# Patient Record
Sex: FEMALE | Race: WHITE | NOT HISPANIC OR LATINO
[De-identification: names, ages, dates, MRNs, and addresses within clinical notes are randomized per-mention and may not be internally consistent; named-entity substitution may affect disease eponyms.]

---

## 2017-04-02 ENCOUNTER — RESULT REVIEW (OUTPATIENT)
Age: 22
End: 2017-04-02

## 2018-03-31 ENCOUNTER — INPATIENT (INPATIENT)
Facility: HOSPITAL | Age: 23
LOS: 1 days | Discharge: HOME | DRG: 370 | End: 2018-04-02
Attending: STUDENT IN AN ORGANIZED HEALTH CARE EDUCATION/TRAINING PROGRAM | Admitting: INTERNAL MEDICINE
Payer: COMMERCIAL

## 2018-03-31 VITALS
RESPIRATION RATE: 17 BRPM | DIASTOLIC BLOOD PRESSURE: 78 MMHG | OXYGEN SATURATION: 100 % | SYSTOLIC BLOOD PRESSURE: 114 MMHG | TEMPERATURE: 98 F | HEART RATE: 58 BPM

## 2018-03-31 DIAGNOSIS — Z29.9 ENCOUNTER FOR PROPHYLACTIC MEASURES, UNSPECIFIED: ICD-10-CM

## 2018-03-31 DIAGNOSIS — Q87.89 OTHER SPECIFIED CONGENITAL MALFORMATION SYNDROMES, NOT ELSEWHERE CLASSIFIED: ICD-10-CM

## 2018-03-31 DIAGNOSIS — I85.01 ESOPHAGEAL VARICES WITH BLEEDING: ICD-10-CM

## 2018-03-31 DIAGNOSIS — D69.6 THROMBOCYTOPENIA, UNSPECIFIED: ICD-10-CM

## 2018-03-31 DIAGNOSIS — F32.9 MAJOR DEPRESSIVE DISORDER, SINGLE EPISODE, UNSPECIFIED: ICD-10-CM

## 2018-03-31 DIAGNOSIS — K92.0 HEMATEMESIS: ICD-10-CM

## 2018-03-31 DIAGNOSIS — F41.9 ANXIETY DISORDER, UNSPECIFIED: ICD-10-CM

## 2018-03-31 LAB
ALBUMIN SERPL ELPH-MCNC: 4.2 G/DL — SIGNIFICANT CHANGE UP (ref 3.3–5)
ALBUMIN SERPL ELPH-MCNC: 4.3 G/DL — SIGNIFICANT CHANGE UP (ref 3.3–5)
ALP SERPL-CCNC: 76 U/L — SIGNIFICANT CHANGE UP (ref 40–120)
ALP SERPL-CCNC: 78 U/L — SIGNIFICANT CHANGE UP (ref 40–120)
ALT FLD-CCNC: 19 U/L RC — SIGNIFICANT CHANGE UP (ref 10–45)
ALT FLD-CCNC: 24 U/L RC — SIGNIFICANT CHANGE UP (ref 10–45)
ANION GAP SERPL CALC-SCNC: 13 MMOL/L — SIGNIFICANT CHANGE UP (ref 5–17)
ANION GAP SERPL CALC-SCNC: 13 MMOL/L — SIGNIFICANT CHANGE UP (ref 5–17)
APTT BLD: 26.9 SEC — LOW (ref 27.5–37.4)
APTT BLD: 30 SEC — SIGNIFICANT CHANGE UP (ref 27.5–37.4)
AST SERPL-CCNC: 34 U/L — SIGNIFICANT CHANGE UP (ref 10–40)
AST SERPL-CCNC: 52 U/L — HIGH (ref 10–40)
BASOPHILS # BLD AUTO: 0 K/UL — SIGNIFICANT CHANGE UP (ref 0–0.2)
BASOPHILS # BLD AUTO: 0.1 K/UL — SIGNIFICANT CHANGE UP (ref 0–0.2)
BASOPHILS # BLD AUTO: 0.1 K/UL — SIGNIFICANT CHANGE UP (ref 0–0.2)
BASOPHILS NFR BLD AUTO: 0.4 % — SIGNIFICANT CHANGE UP (ref 0–2)
BASOPHILS NFR BLD AUTO: 0.5 % — SIGNIFICANT CHANGE UP (ref 0–2)
BASOPHILS NFR BLD AUTO: 0.8 % — SIGNIFICANT CHANGE UP (ref 0–2)
BILIRUB SERPL-MCNC: 0.5 MG/DL — SIGNIFICANT CHANGE UP (ref 0.2–1.2)
BILIRUB SERPL-MCNC: 0.8 MG/DL — SIGNIFICANT CHANGE UP (ref 0.2–1.2)
BLD GP AB SCN SERPL QL: NEGATIVE — SIGNIFICANT CHANGE UP
BUN SERPL-MCNC: 12 MG/DL — SIGNIFICANT CHANGE UP (ref 7–23)
BUN SERPL-MCNC: 14 MG/DL — SIGNIFICANT CHANGE UP (ref 7–23)
CALCIUM SERPL-MCNC: 8.5 MG/DL — SIGNIFICANT CHANGE UP (ref 8.4–10.5)
CALCIUM SERPL-MCNC: 8.8 MG/DL — SIGNIFICANT CHANGE UP (ref 8.4–10.5)
CHLORIDE SERPL-SCNC: 101 MMOL/L — SIGNIFICANT CHANGE UP (ref 96–108)
CHLORIDE SERPL-SCNC: 101 MMOL/L — SIGNIFICANT CHANGE UP (ref 96–108)
CO2 SERPL-SCNC: 21 MMOL/L — LOW (ref 22–31)
CO2 SERPL-SCNC: 24 MMOL/L — SIGNIFICANT CHANGE UP (ref 22–31)
CREAT SERPL-MCNC: 0.63 MG/DL — SIGNIFICANT CHANGE UP (ref 0.5–1.3)
CREAT SERPL-MCNC: 0.67 MG/DL — SIGNIFICANT CHANGE UP (ref 0.5–1.3)
EOSINOPHIL # BLD AUTO: 0 K/UL — SIGNIFICANT CHANGE UP (ref 0–0.5)
EOSINOPHIL # BLD AUTO: 0.1 K/UL — SIGNIFICANT CHANGE UP (ref 0–0.5)
EOSINOPHIL # BLD AUTO: 0.2 K/UL — SIGNIFICANT CHANGE UP (ref 0–0.5)
EOSINOPHIL NFR BLD AUTO: 0.4 % — SIGNIFICANT CHANGE UP (ref 0–6)
EOSINOPHIL NFR BLD AUTO: 0.7 % — SIGNIFICANT CHANGE UP (ref 0–6)
EOSINOPHIL NFR BLD AUTO: 1.7 % — SIGNIFICANT CHANGE UP (ref 0–6)
GAS PNL BLDV: SIGNIFICANT CHANGE UP
GLUCOSE SERPL-MCNC: 116 MG/DL — HIGH (ref 70–99)
GLUCOSE SERPL-MCNC: 162 MG/DL — HIGH (ref 70–99)
HCG SERPL-ACNC: <2 MIU/ML — LOW (ref 5–24)
HCT VFR BLD CALC: 32.4 % — LOW (ref 34.5–45)
HCT VFR BLD CALC: 35.5 % — SIGNIFICANT CHANGE UP (ref 34.5–45)
HCT VFR BLD CALC: 35.7 % — SIGNIFICANT CHANGE UP (ref 34.5–45)
HGB BLD-MCNC: 11.2 G/DL — LOW (ref 11.5–15.5)
HGB BLD-MCNC: 12 G/DL — SIGNIFICANT CHANGE UP (ref 11.5–15.5)
HGB BLD-MCNC: 12.2 G/DL — SIGNIFICANT CHANGE UP (ref 11.5–15.5)
INR BLD: 1.25 RATIO — HIGH (ref 0.88–1.16)
INR BLD: 1.27 RATIO — HIGH (ref 0.88–1.16)
LYMPHOCYTES # BLD AUTO: 1.2 K/UL — SIGNIFICANT CHANGE UP (ref 1–3.3)
LYMPHOCYTES # BLD AUTO: 20.1 % — SIGNIFICANT CHANGE UP (ref 13–44)
LYMPHOCYTES # BLD AUTO: 26.2 % — SIGNIFICANT CHANGE UP (ref 13–44)
LYMPHOCYTES # BLD AUTO: 3.1 K/UL — SIGNIFICANT CHANGE UP (ref 1–3.3)
LYMPHOCYTES # BLD AUTO: 3.3 K/UL — SIGNIFICANT CHANGE UP (ref 1–3.3)
LYMPHOCYTES # BLD AUTO: 33.3 % — SIGNIFICANT CHANGE UP (ref 13–44)
MAGNESIUM SERPL-MCNC: 1.9 MG/DL — SIGNIFICANT CHANGE UP (ref 1.6–2.6)
MCHC RBC-ENTMCNC: 30.1 PG — SIGNIFICANT CHANGE UP (ref 27–34)
MCHC RBC-ENTMCNC: 30.6 PG — SIGNIFICANT CHANGE UP (ref 27–34)
MCHC RBC-ENTMCNC: 30.9 PG — SIGNIFICANT CHANGE UP (ref 27–34)
MCHC RBC-ENTMCNC: 33.8 GM/DL — SIGNIFICANT CHANGE UP (ref 32–36)
MCHC RBC-ENTMCNC: 34 GM/DL — SIGNIFICANT CHANGE UP (ref 32–36)
MCHC RBC-ENTMCNC: 34.7 GM/DL — SIGNIFICANT CHANGE UP (ref 32–36)
MCV RBC AUTO: 88.9 FL — SIGNIFICANT CHANGE UP (ref 80–100)
MCV RBC AUTO: 89.1 FL — SIGNIFICANT CHANGE UP (ref 80–100)
MCV RBC AUTO: 89.9 FL — SIGNIFICANT CHANGE UP (ref 80–100)
MONOCYTES # BLD AUTO: 0.5 K/UL — SIGNIFICANT CHANGE UP (ref 0–0.9)
MONOCYTES # BLD AUTO: 0.7 K/UL — SIGNIFICANT CHANGE UP (ref 0–0.9)
MONOCYTES # BLD AUTO: 0.9 K/UL — SIGNIFICANT CHANGE UP (ref 0–0.9)
MONOCYTES NFR BLD AUTO: 5.3 % — SIGNIFICANT CHANGE UP (ref 2–14)
MONOCYTES NFR BLD AUTO: 8 % — SIGNIFICANT CHANGE UP (ref 2–14)
MONOCYTES NFR BLD AUTO: 9.6 % — SIGNIFICANT CHANGE UP (ref 2–14)
NEUTROPHILS # BLD AUTO: 4.2 K/UL — SIGNIFICANT CHANGE UP (ref 1.8–7.4)
NEUTROPHILS # BLD AUTO: 5.1 K/UL — SIGNIFICANT CHANGE UP (ref 1.8–7.4)
NEUTROPHILS # BLD AUTO: 8.5 K/UL — HIGH (ref 1.8–7.4)
NEUTROPHILS NFR BLD AUTO: 54.6 % — SIGNIFICANT CHANGE UP (ref 43–77)
NEUTROPHILS NFR BLD AUTO: 67.5 % — SIGNIFICANT CHANGE UP (ref 43–77)
NEUTROPHILS NFR BLD AUTO: 70.8 % — SIGNIFICANT CHANGE UP (ref 43–77)
PHOSPHATE SERPL-MCNC: 2.2 MG/DL — LOW (ref 2.5–4.5)
PLAT MORPH BLD: NORMAL — SIGNIFICANT CHANGE UP
PLATELET # BLD AUTO: 110 K/UL — LOW (ref 150–400)
PLATELET # BLD AUTO: 155 K/UL — SIGNIFICANT CHANGE UP (ref 150–400)
PLATELET # BLD AUTO: 79 K/UL — LOW (ref 150–400)
POTASSIUM SERPL-MCNC: 4 MMOL/L — SIGNIFICANT CHANGE UP (ref 3.5–5.3)
POTASSIUM SERPL-MCNC: 5.8 MMOL/L — HIGH (ref 3.5–5.3)
POTASSIUM SERPL-SCNC: 4 MMOL/L — SIGNIFICANT CHANGE UP (ref 3.5–5.3)
POTASSIUM SERPL-SCNC: 5.8 MMOL/L — HIGH (ref 3.5–5.3)
PROT SERPL-MCNC: 7.5 G/DL — SIGNIFICANT CHANGE UP (ref 6–8.3)
PROT SERPL-MCNC: 7.8 G/DL — SIGNIFICANT CHANGE UP (ref 6–8.3)
PROTHROM AB SERPL-ACNC: 13.7 SEC — HIGH (ref 9.8–12.7)
PROTHROM AB SERPL-ACNC: 13.8 SEC — HIGH (ref 9.8–12.7)
RBC # BLD: 3.63 M/UL — LOW (ref 3.8–5.2)
RBC # BLD: 3.97 M/UL — SIGNIFICANT CHANGE UP (ref 3.8–5.2)
RBC # BLD: 3.99 M/UL — SIGNIFICANT CHANGE UP (ref 3.8–5.2)
RBC # FLD: 13.7 % — SIGNIFICANT CHANGE UP (ref 10.3–14.5)
RBC # FLD: 14 % — SIGNIFICANT CHANGE UP (ref 10.3–14.5)
RBC # FLD: 14 % — SIGNIFICANT CHANGE UP (ref 10.3–14.5)
RBC BLD AUTO: SIGNIFICANT CHANGE UP
RH IG SCN BLD-IMP: POSITIVE — SIGNIFICANT CHANGE UP
SODIUM SERPL-SCNC: 135 MMOL/L — SIGNIFICANT CHANGE UP (ref 135–145)
SODIUM SERPL-SCNC: 138 MMOL/L — SIGNIFICANT CHANGE UP (ref 135–145)
WBC # BLD: 12.6 K/UL — HIGH (ref 3.8–10.5)
WBC # BLD: 6 K/UL — SIGNIFICANT CHANGE UP (ref 3.8–10.5)
WBC # BLD: 9.3 K/UL — SIGNIFICANT CHANGE UP (ref 3.8–10.5)
WBC # FLD AUTO: 12.6 K/UL — HIGH (ref 3.8–10.5)
WBC # FLD AUTO: 6 K/UL — SIGNIFICANT CHANGE UP (ref 3.8–10.5)
WBC # FLD AUTO: 9.3 K/UL — SIGNIFICANT CHANGE UP (ref 3.8–10.5)

## 2018-03-31 PROCEDURE — 71045 X-RAY EXAM CHEST 1 VIEW: CPT | Mod: 26,77

## 2018-03-31 PROCEDURE — 31500 INSERT EMERGENCY AIRWAY: CPT

## 2018-03-31 PROCEDURE — 93010 ELECTROCARDIOGRAM REPORT: CPT

## 2018-03-31 PROCEDURE — 99291 CRITICAL CARE FIRST HOUR: CPT

## 2018-03-31 PROCEDURE — 99292 CRITICAL CARE ADDL 30 MIN: CPT

## 2018-03-31 PROCEDURE — 71045 X-RAY EXAM CHEST 1 VIEW: CPT | Mod: 26

## 2018-03-31 PROCEDURE — 99291 CRITICAL CARE FIRST HOUR: CPT | Mod: 25

## 2018-03-31 RX ORDER — ALPRAZOLAM 0.25 MG
1 TABLET ORAL
Qty: 0 | Refills: 0 | Status: DISCONTINUED | OUTPATIENT
Start: 2018-03-31 | End: 2018-04-02

## 2018-03-31 RX ORDER — NOREPINEPHRINE BITARTRATE/D5W 8 MG/250ML
0.01 PLASTIC BAG, INJECTION (ML) INTRAVENOUS
Qty: 8 | Refills: 0 | Status: DISCONTINUED | OUTPATIENT
Start: 2018-03-31 | End: 2018-03-31

## 2018-03-31 RX ORDER — OCTREOTIDE ACETATE 200 UG/ML
50 INJECTION, SOLUTION INTRAVENOUS; SUBCUTANEOUS
Qty: 500 | Refills: 0 | Status: DISCONTINUED | OUTPATIENT
Start: 2018-03-31 | End: 2018-03-31

## 2018-03-31 RX ORDER — PROPOFOL 10 MG/ML
330 INJECTION, EMULSION INTRAVENOUS ONCE
Qty: 0 | Refills: 0 | Status: COMPLETED | OUTPATIENT
Start: 2018-03-31 | End: 2018-03-31

## 2018-03-31 RX ORDER — MIDAZOLAM HYDROCHLORIDE 1 MG/ML
4 INJECTION, SOLUTION INTRAMUSCULAR; INTRAVENOUS ONCE
Qty: 0 | Refills: 0 | Status: DISCONTINUED | OUTPATIENT
Start: 2018-03-31 | End: 2018-03-31

## 2018-03-31 RX ORDER — FENTANYL CITRATE 50 UG/ML
100 INJECTION INTRAVENOUS ONCE
Qty: 0 | Refills: 0 | Status: DISCONTINUED | OUTPATIENT
Start: 2018-03-31 | End: 2018-03-31

## 2018-03-31 RX ORDER — ALPRAZOLAM 0.25 MG
0.5 TABLET ORAL
Qty: 0 | Refills: 0 | Status: DISCONTINUED | OUTPATIENT
Start: 2018-03-31 | End: 2018-04-02

## 2018-03-31 RX ORDER — ONDANSETRON 8 MG/1
4 TABLET, FILM COATED ORAL THREE TIMES A DAY
Qty: 0 | Refills: 0 | Status: DISCONTINUED | OUTPATIENT
Start: 2018-03-31 | End: 2018-04-02

## 2018-03-31 RX ORDER — PROPOFOL 10 MG/ML
330 INJECTION, EMULSION INTRAVENOUS ONCE
Qty: 0 | Refills: 0 | Status: DISCONTINUED | OUTPATIENT
Start: 2018-03-31 | End: 2018-03-31

## 2018-03-31 RX ORDER — ONDANSETRON 8 MG/1
4 TABLET, FILM COATED ORAL ONCE
Qty: 0 | Refills: 0 | Status: COMPLETED | OUTPATIENT
Start: 2018-03-31 | End: 2018-03-31

## 2018-03-31 RX ORDER — PANTOPRAZOLE SODIUM 20 MG/1
40 TABLET, DELAYED RELEASE ORAL ONCE
Qty: 0 | Refills: 0 | Status: COMPLETED | OUTPATIENT
Start: 2018-03-31 | End: 2018-03-31

## 2018-03-31 RX ORDER — PANTOPRAZOLE SODIUM 20 MG/1
8 TABLET, DELAYED RELEASE ORAL
Qty: 80 | Refills: 0 | Status: DISCONTINUED | OUTPATIENT
Start: 2018-03-31 | End: 2018-03-31

## 2018-03-31 RX ORDER — ALPRAZOLAM 0.25 MG
1 TABLET ORAL
Qty: 0 | Refills: 0 | COMMUNITY

## 2018-03-31 RX ORDER — OCTREOTIDE ACETATE 200 UG/ML
50 INJECTION, SOLUTION INTRAVENOUS; SUBCUTANEOUS ONCE
Qty: 0 | Refills: 0 | Status: COMPLETED | OUTPATIENT
Start: 2018-03-31 | End: 2018-03-31

## 2018-03-31 RX ORDER — CEFTRIAXONE 500 MG/1
1 INJECTION, POWDER, FOR SOLUTION INTRAMUSCULAR; INTRAVENOUS ONCE
Qty: 0 | Refills: 0 | Status: COMPLETED | OUTPATIENT
Start: 2018-03-31 | End: 2018-03-31

## 2018-03-31 RX ORDER — PROPOFOL 10 MG/ML
5 INJECTION, EMULSION INTRAVENOUS
Qty: 500 | Refills: 0 | Status: DISCONTINUED | OUTPATIENT
Start: 2018-03-31 | End: 2018-03-31

## 2018-03-31 RX ORDER — PANTOPRAZOLE SODIUM 20 MG/1
40 TABLET, DELAYED RELEASE ORAL
Qty: 0 | Refills: 0 | Status: DISCONTINUED | OUTPATIENT
Start: 2018-03-31 | End: 2018-04-02

## 2018-03-31 RX ORDER — ESCITALOPRAM OXALATE 10 MG/1
40 TABLET, FILM COATED ORAL DAILY
Qty: 0 | Refills: 0 | Status: DISCONTINUED | OUTPATIENT
Start: 2018-03-31 | End: 2018-04-02

## 2018-03-31 RX ORDER — SODIUM CHLORIDE 9 MG/ML
1000 INJECTION INTRAMUSCULAR; INTRAVENOUS; SUBCUTANEOUS ONCE
Qty: 0 | Refills: 0 | Status: COMPLETED | OUTPATIENT
Start: 2018-03-31 | End: 2018-03-31

## 2018-03-31 RX ADMIN — PANTOPRAZOLE SODIUM 40 MILLIGRAM(S): 20 TABLET, DELAYED RELEASE ORAL at 18:20

## 2018-03-31 RX ADMIN — OCTREOTIDE ACETATE 10 MICROGRAM(S)/HR: 200 INJECTION, SOLUTION INTRAVENOUS; SUBCUTANEOUS at 07:53

## 2018-03-31 RX ADMIN — FENTANYL CITRATE 100 MICROGRAM(S): 50 INJECTION INTRAVENOUS at 11:30

## 2018-03-31 RX ADMIN — FENTANYL CITRATE 100 MICROGRAM(S): 50 INJECTION INTRAVENOUS at 12:00

## 2018-03-31 RX ADMIN — Medication 0.5 MILLIGRAM(S): at 18:20

## 2018-03-31 RX ADMIN — SODIUM CHLORIDE 1000 MILLILITER(S): 9 INJECTION INTRAMUSCULAR; INTRAVENOUS; SUBCUTANEOUS at 17:10

## 2018-03-31 RX ADMIN — FENTANYL CITRATE 100 MICROGRAM(S): 50 INJECTION INTRAVENOUS at 11:45

## 2018-03-31 RX ADMIN — MIDAZOLAM HYDROCHLORIDE 4 MILLIGRAM(S): 1 INJECTION, SOLUTION INTRAMUSCULAR; INTRAVENOUS at 11:45

## 2018-03-31 RX ADMIN — CEFTRIAXONE 100 GRAM(S): 500 INJECTION, POWDER, FOR SOLUTION INTRAMUSCULAR; INTRAVENOUS at 06:55

## 2018-03-31 RX ADMIN — FENTANYL CITRATE 100 MICROGRAM(S): 50 INJECTION INTRAVENOUS at 12:45

## 2018-03-31 RX ADMIN — FENTANYL CITRATE 100 MICROGRAM(S): 50 INJECTION INTRAVENOUS at 12:30

## 2018-03-31 RX ADMIN — ONDANSETRON 4 MILLIGRAM(S): 8 TABLET, FILM COATED ORAL at 09:00

## 2018-03-31 RX ADMIN — PROPOFOL 330 MILLIGRAM(S): 10 INJECTION, EMULSION INTRAVENOUS at 12:30

## 2018-03-31 RX ADMIN — MIDAZOLAM HYDROCHLORIDE 4 MILLIGRAM(S): 1 INJECTION, SOLUTION INTRAMUSCULAR; INTRAVENOUS at 11:20

## 2018-03-31 RX ADMIN — PANTOPRAZOLE SODIUM 10 MG/HR: 20 TABLET, DELAYED RELEASE ORAL at 09:00

## 2018-03-31 RX ADMIN — OCTREOTIDE ACETATE 50 MICROGRAM(S): 200 INJECTION, SOLUTION INTRAVENOUS; SUBCUTANEOUS at 08:00

## 2018-03-31 RX ADMIN — MIDAZOLAM HYDROCHLORIDE 4 MILLIGRAM(S): 1 INJECTION, SOLUTION INTRAMUSCULAR; INTRAVENOUS at 11:30

## 2018-03-31 RX ADMIN — Medication 0.5 MILLIGRAM(S): at 23:05

## 2018-03-31 RX ADMIN — PANTOPRAZOLE SODIUM 40 MILLIGRAM(S): 20 TABLET, DELAYED RELEASE ORAL at 06:54

## 2018-03-31 RX ADMIN — FENTANYL CITRATE 100 MICROGRAM(S): 50 INJECTION INTRAVENOUS at 12:15

## 2018-03-31 RX ADMIN — FENTANYL CITRATE 100 MICROGRAM(S): 50 INJECTION INTRAVENOUS at 13:00

## 2018-03-31 RX ADMIN — MIDAZOLAM HYDROCHLORIDE 4 MILLIGRAM(S): 1 INJECTION, SOLUTION INTRAMUSCULAR; INTRAVENOUS at 12:50

## 2018-03-31 RX ADMIN — ONDANSETRON 4 MILLIGRAM(S): 8 TABLET, FILM COATED ORAL at 06:55

## 2018-03-31 NOTE — CHART NOTE - NSCHARTNOTEFT_GEN_A_CORE
UPPER ENDOSCOPY Performed with Dr Proctor and Dr. Myrick     IMPRESSION  - Dionne-Laura tear. This is likely the source of bleeding.   - Hematin (altered blood/coffee-ground-like material) in the gastric fundus.   - Non-bleeding erosive gastropathy.   - Normal duodenal bulb and 2nd part of the duodenum.   - No specimens collected.     RECOMMENDATION  - Observe patient in MICU for ongoing care.   - Use a proton pump inhibitor IV BID.   - Use Zofran (ondansetron) 4 mg IV TID.   - Recommend antitussives.   - Monitor CBC, transfuse as needed to goal Hb>7  - Discussed with family including mother, father and significant partner.   - Dr Proctor's team will continue care for this patient. UPPER ENDOSCOPY Performed with Dr Proctor and Dr. Myrick     IMPRESSION  - Prior scarring in esophagus from prior variceal banding  - Dionne-Laura tear. This is likely the source of bleeding.   - Hematin (altered blood/coffee-ground-like material) in the gastric fundus.   - Mild portal hypertensive gastropathy and gastric erosions.  - Possible non-bleeding gastric varices.   - Normal duodenal bulb and 2nd part of the duodenum.     RECOMMENDATION  - Observe patient in MICU for ongoing care.   - Use a proton pump inhibitor IV BID.   - Use Zofran (ondansetron) 4 mg IV TID. Check EKG for QTc prolongation  - Recommend antitussives.   - Recommend abdominal sonogram complete.  - Monitor CBC, transfuse as needed to goal Hb>7  - Extubation per MICU team  - Diet as tolerated   - Due to computer issues with the travel cart, pictures could not be captured.   - Discussed with family including mother, father and significant partner.   - Dr Proctor's team will continue care for this patient.

## 2018-03-31 NOTE — PROCEDURE NOTE - NSTRACHPOSTINTU_RESP_A_CORE
Chest X-Ray/Positive end tidal Co2 noted/Breath sounds equal/Chest excursion noted/Breath sounds bilateral

## 2018-03-31 NOTE — ED ADULT NURSE NOTE - PMH
ADHD (attention deficit hyperactivity disorder)    Anxiety    ASD (atrial septal defect)    Branchio-elizabeth-renal syndrome    Depression    Duplicate cervix    Esophageal varices  s/p banding 1996  Hypersplenism    Iron deficiency anemia    Polycystic ovarian syndrome  diagnosed 7/2011  Portal hypertension    Portal vein thrombosis  1996  Thrombocytopenia    Vaginal septum  1994

## 2018-03-31 NOTE — ED PROVIDER NOTE - CARE PLAN
Principal Discharge DX:	Bleeding esophageal varices, unspecified esophageal varices type  Secondary Diagnosis:	Hematemesis with nausea

## 2018-03-31 NOTE — ED PROVIDER NOTE - OBJECTIVE STATEMENT
Resident: 23y F PMH branchio-elizabeth-renal syndrome (diagnosed 1994), portal hypertension, esophageal varices s/p banding (last 1996), portal vein thrombosis, ASD repair, anxiety/depression requiring inpatient hospitalization in past presenting with hematemesis x 1 episode this AM. Patient states she woke up from sleep feeling nauseous, had 1 episode large volume maroon vomitus. Since vomiting, patient complains of mild epigastric discomfort. Resident: 23y F PMH branchio-elizabeth-renal syndrome (diagnosed 1994), portal hypertension, esophageal varices s/p banding (last 1996), portal vein thrombosis, ASD repair, anxiety/depression requiring inpatient hospitalization in past presenting with hematemesis x 1 episode this AM. Patient states she woke up from sleep feeling nauseous, had 1 episode large volume maroon vomitus. Since vomiting, patient complains of mild epigastric discomfort.    LEXI Titus

## 2018-03-31 NOTE — CONSULT NOTE ADULT - ASSESSMENT
23 WF congenital liver disease with a history of portal vein thrombosis, esophageal varices (at age 1 s/p banding), portal hypertension, ASD s/p repair, anxiety/depression presenting with hematemesis.  Differential of bleeding: PHG, MW tear, PUD, GAVE, recurrent varices (doubt).    P: Admit to ICU  NPO  Monitor VS, BMs, labs  Transfuse prn  IV ppi  IV octreotide  EGD today. RBA explained.    Thanks you.

## 2018-03-31 NOTE — H&P ADULT - NSHPLABSRESULTS_GEN_ALL_CORE
Vital Signs Last 24 Hrs  T(C): 36.6 (31 Mar 2018 06:06), Max: 36.6 (31 Mar 2018 06:06)  T(F): 97.8 (31 Mar 2018 06:06), Max: 97.8 (31 Mar 2018 06:06)  HR: 85 (31 Mar 2018 11:30) (58 - 88)  BP: 114/66 (31 Mar 2018 11:30) (96/67 - 132/70)  BP(mean): 84 (31 Mar 2018 11:30) (84 - 84)  RR: 21 (31 Mar 2018 11:30) (16 - 21)  SpO2: 100% (31 Mar 2018 11:30) (100% - 100%)  CAPILLARY BLOOD GLUCOSE      LABS:                        12.2   12.6  )-----------( 155      ( 31 Mar 2018 12:35 )             35.7     Auto Eosinophil # 0.1   / Auto Eosinophil % 0.4   / Auto Neutrophil # 8.5   / Auto Neutrophil % 67.5  / BANDS % x                            11.2   6.0   )-----------( 79       ( 31 Mar 2018 09:16 )             32.4     Auto Eosinophil # 0.0   / Auto Eosinophil % 0.7   / Auto Neutrophil # 4.2   / Auto Neutrophil % 70.8  / BANDS % x                            12.0   9.3   )-----------( 110      ( 31 Mar 2018 06:58 )             35.5     Auto Eosinophil # 0.2   / Auto Eosinophil % 1.7   / Auto Neutrophil # 5.1   / Auto Neutrophil % 54.6  / BANDS % x        03-31    135  |  101  |  12  ----------------------------<  162<H>  5.8<H>   |  21<L>  |  0.63  03-31    138  |  101  |  14  ----------------------------<  116<H>  4.0   |  24  |  0.67    Ca    8.5      31 Mar 2018 12:35  Mg     1.9     03-31  Phos  2.2     03-31  TPro  7.8  /  Alb  4.3  /  TBili  0.8  /  DBili  x   /  AST  52<H>  /  ALT  24  /  AlkPhos  76  03-31  TPro  7.5  /  Alb  4.2  /  TBili  0.5  /  DBili  x   /  AST  34  /  ALT  19  /  AlkPhos  78  03-31    PT/INR - ( 31 Mar 2018 12:35 )   PT: 13.8 sec;   INR: 1.27 ratio         PTT - ( 31 Mar 2018 12:35 )  PTT:30.0 sec          RADIOLOGY & ADDITIONAL TESTS:    Imaging Personally Reviewed:    Consultant(s) Notes Reviewed:  gastro    Care Discussed with Consultants/Other Providers: gastro

## 2018-03-31 NOTE — CHART NOTE - NSCHARTNOTEFT_GEN_A_CORE
Patient Name:	Vanessa Antunez	YOB: 1995  Address:	[address] [place] [address]	Sex:	Female  Rx Written	Rx Dispensed	Drug	Quantity	Days Supply	Prescriber Name  03/16/2018	03/20/2018	alprazolam 1 mg tablet	60	30	HectorAlec MD  03/16/2018	03/17/2018	alprazolam er 1 mg tablet	60	30	HectorAlec MD  03/02/2018	03/05/2018	dexmethylphenidate er 15 mg cp	30	30	HectorAlec MD  02/16/2018	02/16/2018	alprazolam 1 mg tablet	60	30	HectorAlec MD  02/16/2018	02/16/2018	alprazolam er 1 mg tablet	60	30	HectorAlec MD  02/05/2018	02/05/2018	alprazolam 1 mg tablet	28	14	Pietro PHAM, Kyaw  01/23/2018	01/29/2018	dexmethylphenidate er 15 mg cp	30	30	HectorAlec MD  01/05/2018	01/05/2018	alprazolam 1 mg tablet	60	30	HectorAlec MD  01/05/2018	01/05/2018	alprazolam er 1 mg tablet	60	30	HectorAlec MD  12/01/2017	12/07/2017	alprazolam er 1 mg tablet	60	30	HectorAlec MD  12/01/2017	12/06/2017	alprazolam 1 mg tablet	60	30	HectorAlec MD  11/10/2017	11/10/2017	alprazolam er 1 mg tablet	60	30	HectorAlec MD  11/09/2017	11/09/2017	alprazolam 1 mg tablet	60	30	HectorAlec MD  10/12/2017	10/12/2017	alprazolam er 1 mg tablet	60	30	HectorAlec MD  10/11/2017	10/11/2017	alprazolam 1 mg tablet	60	30	HectorAlec MD  09/22/2017	09/27/2017	dexmethylphenidate er 15 mg cp	30	30	HectorAlec MD  09/06/2017	09/06/2017	alprazolam 1 mg tablet	60	30	HectorAlec MD  09/01/2017	09/01/2017	alprazolam er 1 mg tablet	60	30	HectorAlec MD  07/18/2017	07/18/2017	alprazolam 1 mg tablet	60	30	[place]  07/18/2017	07/18/2017	dexmethylphenidate er 15 mg cp	30	30	[place], Alec ANDRADE MD  07/18/2017	07/18/2017	alprazolam er 1 mg tablet	60	30	[place], Alec ANDRADE MD  06/14/2017	06/14/2017	alprazolam er 1 mg tablet	60	30	[place] MD  06/09/2017	06/09/2017	dexmethylphenidate er 15 mg cp	30	30	[place] MD  05/12/2017	05/26/2017	alprazolam 1 mg tablet	60	30	[place], Alec ANDRADE MD  05/24/2017	05/24/2017	dexmethylphenidate er 20 mg cp	30	30	[place], Alec ANDRADE MD  04/24/2017	05/15/2017	alprazolam er 1 mg tablet	60	30	[place] MD  05/12/2017	05/12/2017	tramadol hcl 50 mg tablet	30	10	[facility] Susi  04/07/2017	04/27/2017	alprazolam 1 mg tablet	60	30	[place] MD  04/11/2017	04/11/2017	alprazolam er 1 mg tablet	60	30	[place] MD  04/07/2017	04/07/2017	dexmethylphenidate er 20 mg cp	30	30	[place] MD

## 2018-03-31 NOTE — AIRWAY REMOVAL NOTE  ADULT & PEDS - ARTIFICAL AIRWAY REMOVAL COMMENTS
Patient extubated without complications. Written order for extubation was verified. The patient was identified by full name and birthdate compared to the ID band. Present during the procedure was DIRK Blackman.

## 2018-03-31 NOTE — CHART NOTE - NSCHARTNOTEFT_GEN_A_CORE
Please see MICU resident note for detailed summary.     Pt is a 23F w/ hx of branchio/elizabeth/renal syndrome (dx [id number]) c/b portal HTN w/ esophageal varices (s/p banding), hx of Dionne Laura tear (2013), chronic thrombocytopenia (baseline 60-100s) 2/2 hypersplenism, anxiety and chronic cough (unclear etiology) who p/w 2 episodes of hematemesis earlier this morning. On admission, pt’s hgb was 12.0 (and subsequently 11.2) and she had another episode of hematemesis so pt was admitted to the MICU for urgent EGD. Patient was intubated (and sedated) and an EGD showed a Dionne Laura tear, which was likely the source of bleeding. Pt was extubated post-procedure without any complications.       Follow up:   - monitor CBC and transfuse to goal of 7  - Zofran PRN for nausea   - continue antitussives for cough   - Protonix IV BID   - f/u abdominal US   - Continue Lexapro and alprazolam for anxiety     Brenna Guevara, MAR   [id number]

## 2018-03-31 NOTE — ED ADULT NURSE NOTE - PSH
ASD (atrial septal defect)  s/p repair  Cataract  both eyes - cataract removal with lens implantation  Hernia  bilateral hernia repair  Megaureter  left (1994)  Varices, esophageal  s/p banding

## 2018-03-31 NOTE — H&P ADULT - HISTORY OF PRESENT ILLNESS
23yof hx of branchio/elizabeth/renal syndrome (dc 1994) c/b portal htn, esophageal varices s/p banding, hx of Mallary Laura tear in 2013 presents with hemetemesis x 2 episodes. Pt states she felt nauseous around 5 am this morning, and then had a large volume maroon colored emesis, no clots, no coffee grounds. Mom at bedside states pt presented similarly in 2013 and at that time was found to have a Dionne laura tear on endoscopy. 23yof hx of branchio/elizabeth/renal syndrome (dc 1994) c/b portal htn, esophageal varices s/p banding, hx of Mallary Laura tear in 2013 presents with hemetemesis x 2 episodes. Pt states she felt nauseous around 5 am this morning, and then had a large volume maroon colored emesis, no clots, no coffee grounds. Mom at bedside states pt presented similarly in 2013 and at that time was found to have a Dionne laura tear on endoscopy.  Pt was diagnosed at the age of 1, and has multiple esophageal variceal banding between ages 1-9. Pt fol 23yof hx of branchio/elizabeth/renal syndrome (dc 1994) c/b portal htn, esophageal varices s/p banding, hx of Mallary Laura tear in 2013, anxiety (on xanax), chronic thrombocytopenia (baseline 60-100s) 2/2 hypersplenism presents with hemetemesis x 2 episodes. Pt states she felt nauseous around 5 am this morning, and then had a large volume maroon colored emesis, no clots, no coffee grounds. Mom at bedside states pt presented similarly in 2013 and at that time was found to have a Dionne laura tear on endoscopy.  Pt was diagnosed at the age of 1, and has multiple esophageal variceal banding between ages 2-9.     ED: Pt had another episode of hemetemesis (maroon, watery, no clots).  Received ceftriaxone, octreotide, zofran, protonix.   Vitals stable. 23yof hx of branchio/elizabeth/renal syndrome (dc 1994) c/b portal htn, esophageal varices s/p banding, hx of Mallary Laura tear in 2013, anxiety (on xanax), chronic thrombocytopenia (baseline 60-100s) 2/2 hypersplenism presents with hemetemesis x 2 episodes. Pt states she felt nauseous around 5 am this morning, and then had a large volume maroon colored emesis, no clots, no coffee grounds. Mom at bedside states pt presented similarly in 2013 and at that time was found to have a Dionne laura tear on endoscopy.  Per Mom, patient has a chronic cough (outpatient workup has not shown an etiology). Pt was diagnosed at the age of 1, and has multiple esophageal variceal banding between ages 2-9.     ED: Pt had another episode of hemetemesis (maroon, watery, no clots).  Received ceftriaxone, octreotide, zofran, protonix.   Vitals stable. 23yof hx of branchio/elizabeth/renal syndrome (dc 1994) c/b portal htn, esophageal varices s/p banding, hx of Mallary Laura tear in 2013, anxiety (on xanax), chronic thrombocytopenia (baseline 60-100s) 2/2 hypersplenism presents with hemetemesis x 2 episodes. Pt states she felt nauseous around 5 am this morning, and then had a large volume maroon colored emesis, no clots, no coffee grounds. Mom at bedside states pt presented similarly in 2013 and at that time was found to have a Dionne laura tear on endoscopy.  Per Mom, patient has a chronic cough (outpatient workup has not shown an etiology). Mom reports increased forceful coughing. Pt was diagnosed at the age of 1 with branchio-elizabeth-renal syndrome, and has multiple esophageal variceal banding between ages 2-9.     ED: Pt had another episode of hemetemesis (maroon, watery, no clots).  Received ceftriaxone, octreotide, zofran, protonix.   Vitals stable.

## 2018-03-31 NOTE — H&P ADULT - NSHPREVIEWOFSYSTEMS_GEN_ALL_CORE
CONSTITUTIONAL:  No weight loss, fever, chills, weakness or fatigue.  HEENT:  Eyes:  No visual loss, blurred vision, double vision,No hearing loss, sneezing, congestion, runny nose or sore throat.  SKIN:  No rash or itching.  CARDIOVASCULAR:  No chest pain, chest pressure or chest discomfort.   RESPIRATORY:  No shortness of breath, cough or sputum.  GASTROINTESTINAL:  No anorexia, No abdominal pain, no melena or hematochezia. +hemetemesis.   GENITOURINARY:  Denies hematuria, dysuria.   NEUROLOGICAL:  No headache, dizziness, syncope, paralysis, ataxia, numbness or tingling in the extremities. No change in bowel or bladder control.  MUSCULOSKELETAL:  No muscle, back pain, joint pain or stiffness.  HEMATOLOGIC:  No anemia, bleeding or bruising.  PSYCHIATRIC:  +hx of depression and anxiety.   ENDOCRINOLOGIC:  No reports of sweating, cold or heat intolerance. No polyuria or polydipsia.

## 2018-03-31 NOTE — ED PROVIDER NOTE - CRITICAL CARE PROVIDED
direct patient care (not related to procedure)/documentation/additional history taking/consult w/ pt's family directly relating to pts condition/interpretation of diagnostic studies/consultation with other physicians

## 2018-03-31 NOTE — ED PROVIDER NOTE - MEDICAL DECISION MAKING DETAILS
Resident: hematemesis. Hx varices s/p banding. vitals wnl. mild epigastric tenderness to palpation. Concern for variceal bleed vs other source of UGIB. Will obtain labs, type and screen, zofran, PPI, GI consult, likely admit.

## 2018-03-31 NOTE — H&P ADULT - ATTENDING COMMENTS
Pt is a 22 yo F with h/o branchio/elizabeth/renal syndrome, complicated by portal htn, esophageal varices s/p banding, Dionne-Laura tear, chronic thrombocytopenia (baseline 60-100s) 2to hypersplenism and anxiety d/o presents 2 to hematemesis x 2 (large volume maroon colored emesis).  Pt admitted to the ICU and electively intubated for EGD. s/p EGD: M-W tear. Post procedure pt was awake/alert and weaning well on CPAP 5 + PS 5; extubated. Cont PPI and advance po diet as tolerated. F/u as per GI. Follow Hb trend. Probably to be transferred out of ICU later today. Family and pt updated.    CCT: 40 min not including the procedure

## 2018-03-31 NOTE — H&P ADULT - ASSESSMENT
23yof hx of branchio/elizabeth/renal syndrome (dc 1994) c/b portal htn, esophageal varices s/p banding, hx of Mallary Laura tear in 2013, anxiety (on xanax), chronic thrombocytopenia (baseline 60-100s) 2/2 hypersplenism presents with hemetemesis x 2 episodes likely 2/2 esophageal varices vs. Dionne Laura tear vs. PUD 23yof hx of branchio/elizabeth/renal syndrome (dc 1994) c/b portal htn, esophageal varices s/p banding, hx of Mallary Laura tear in 2013, anxiety (on xanax), chronic thrombocytopenia (baseline 60-100s) 2/2 hypersplenism presents with hemetemesis x 2 episodes likely 2/2 esophageal varices vs. Dionne Laura tear vs. PUD    #Neuro    #Resp    #CV    #GI    #ID    #Renal    #Heme    #Endo    #DVT PPx

## 2018-03-31 NOTE — ED PROVIDER NOTE - NS ED ROS FT
Constitutional: no fever, no chills.  Eyes: no visual changes.  ENMT: no sore throat.  CV: no chest pain.  Resp: no cough, no shortness of breath.  GI: +abdominal pain, +nausea, +vomiting, no diarrhea.  : no dysuria, no hematuria.  MSK: no back pain, no neck pain.  Skin: no rashes.  Neuro: no headache, no loss of consciousness, no weakness, no numbness, no tingling.

## 2018-03-31 NOTE — CHART NOTE - NSCHARTNOTEFT_GEN_A_CORE
MICU Transfer Note    Transfer from: MICU    Transfer to:  (  ) Medicine    (  ) Telemetry    (  ) RCU    (  ) Palliative    (  ) Stroke Unit    (  ) _______________    Accepting physican:  Morgan Washburn       MICU COURSE:  23yof hx of branchio/elizabeth/renal syndrome (dc 1994) c/b portal htn, esophageal varices s/p banding, hx of Mallary Laura tear in 2013, anxiety (on xanax), chronic thrombocytopenia (baseline 60-100s) 2/2 hypersplenism presents with hemetemesis x 2 episodes. Pt states she felt nauseous around 5 am this morning, and then had a large volume maroon colored emesis, no clots, no coffee grounds. Mom at bedside states pt presented similarly in 2013 and at that time was found to have a Dionne laura tear on endoscopy.  Per Mom, patient has a chronic cough (outpatient workup has not shown an etiology). Mom reports increased forceful coughing. Pt was diagnosed at the age of 1 with branchio-elizabeth-renal syndrome, and has multiple esophageal variceal banding between ages 2-9.     ED: Pt had another episode of hematemesis (maroon, watery, no clots).  Received ceftriaxone, octreotide, zofran, protonix.   MICU course. Patient was sedated and intubated for EGD. Patient EGD consistent with Dionne Laura tear. Patient was extubated, restarted diet. Started on hycodan for cough suppression and zofran for nausea / vomiting.     Last  EKG- .       For Follow-Up:  - C/w hycodan for cough, add tessalon perle if needed   - Check EKG daily for QTc for zofran   - C/w pantoprazole   - F/u with GI recs  - Monitor HGB on CBC  - US Abd as per GI MICU Transfer Note    Transfer from: MICU    Transfer to:  (  ) Medicine    (  ) Telemetry    (  ) RCU    (  ) Palliative    (  ) Stroke Unit    (  ) _______________    Accepting physican:  Morgan Washburn       MICU COURSE:  23yof hx of branchio/elizabeth/renal syndrome (dc 1994) c/b portal htn, esophageal varices s/p banding, hx of Mallary Laura tear in 2013, anxiety (on xanax), chronic thrombocytopenia (baseline 60-100s) 2/2 hypersplenism presents with hemetemesis x 2 episodes. Pt states she felt nauseous around 5 am this morning, and then had a large volume maroon colored emesis, no clots, no coffee grounds. Mom at bedside states pt presented similarly in 2013 and at that time was found to have a Dionne laura tear on endoscopy.  Per Mom, patient has a chronic cough (outpatient workup has not shown an etiology). Mom reports increased forceful coughing. Pt was diagnosed at the age of 1 with branchio-elizabeth-renal syndrome, and has multiple esophageal variceal banding between ages 2-9.     ED: Pt had another episode of hematemesis (maroon, watery, no clots).  Received ceftriaxone, octreotide, zofran, protonix.   MICU course. Patient was sedated and intubated for EGD. Patient EGD consistent with Dionne Laura tear. Patient was extubated, restarted diet. Started on hycodan for cough suppression and zofran for nausea / vomiting.     Last  EKG- .       For Follow-Up:  - C/w hycodan for cough, add tessalon perle if needed   - Check EKG daily for QTc for zofran   - C/w pantoprazole   - F/u with GI recs  - Monitor HGB on serial CBC   - F/u with GI for US Abd  - C/w alprazolam MICU Transfer Note    Transfer from: MICU    Transfer to:  (x  ) Medicine    (  ) Telemetry    (  ) RCU    (  ) Palliative    (  ) Stroke Unit    (  ) _______________    Accepting physican:  Morgan Washburn       MICU COURSE:  23yof hx of branchio/elizabeth/renal syndrome (dc 1994) c/b portal htn, esophageal varices s/p banding, hx of Mallary Carroll tear in 2013, anxiety (on xanax), chronic thrombocytopenia (baseline 60-100s) 2/2 hypersplenism presents with hemetemesis x 2 episodes. Pt states she felt nauseous around 5 am this morning, and then had a large volume maroon colored emesis, no clots, no coffee grounds. Mom at bedside states pt presented similarly in 2013 and at that time was found to have a Dionne carroll tear on endoscopy.  Per Mom, patient has a chronic cough (outpatient workup has not shown an etiology). Mom reports increased forceful coughing. Pt was diagnosed at the age of 1 with branchio-elizabeth-renal syndrome, and has multiple esophageal variceal banding between ages 2-9.     ED: Pt had another episode of hematemesis (maroon, watery, no clots).  Received ceftriaxone, octreotide, zofran, protonix.     MICU course. Patient was sedated and intubated for EGD. Patient EGD consistent with Dionne Carroll tear. Patient was extubated, restarted diet. Started on hycodan for cough suppression and zofran for nausea / vomiting.       For Follow-Up:  - C/w hycodan for cough, add tessalon perle if needed   - monitor EKG while on zofran (Last  EKG- )  - C/w pantoprazole   - F/u with GI recs (Dr. Proctor's group)  - Monitor HGB on serial CBC   - F/u with GI for US Abd to r/o liver disease  - C/w alprazolam and lexapro for anxiety    Hawk Lei, PGY3  [id number]

## 2018-03-31 NOTE — H&P ADULT - PROBLEM SELECTOR PLAN 1
Mallary Larua tear vs. PUD vs. Esophageal varicies  -cw protonix gtt for now  -will do endoscopy in MICU with intubation  -f/up GI recs  -serial CBC, monitor BP  -two large bore IV      Addendium: pt found to have a izzy vlad tear. Will extubate patient when off sedation and start clear liquid diet if tolerating. Mallary Laura tear vs. PUD vs. Esophageal varicies  -cw protonix IV BID for now  -will do endoscopy in MICU with intubation  -f/up GI recs  -serial CBC, monitor BP  -two large bore IV  - zofran 4 mg IV TID. Check EKG for QTc prolongation  - Monitor CBC, transfuse as needed to goal Hb>7    Addendium: pt found to have a izzy vlad tear. Will extubate patient when off sedation and start clear liquid diet if tolerating. Dr. Proctor's team to follow . Abdominal sono as per GI

## 2018-03-31 NOTE — H&P ADULT - NSHPPHYSICALEXAM_GEN_ALL_CORE
GENERAL APPEARANCE: Well developed, well nourished, alert and cooperative. NAD.   HEENT:  PERRL, EOMI. hearing grossly intact. Neck supple, non-tender without lymphadenopathy, masses or thyromegaly.  CARDIAC: Normal S1 and S2. No S3, S4 or murmurs. Rhythm is regular.  LUNGS: Clear to auscultation and percussion without rales, rhonchi, wheezing or diminished breath sounds.  ABDOMEN: Positive bowel sounds. Soft, nondistended, nontender. No guarding or rebound.   MUSKULOSKELETAL: ROM intact spine and extremities. No joint erythema or tenderness.   EXTREMITIES: No significant deformity or joint abnormality. No edema. Peripheral pulses intact. No varicosities.  SKIN: Skin normal color, texture and turgor with no lesions or eruptions.  PSYCHIATRIC: AOx3.Normal affect and behavior.

## 2018-03-31 NOTE — ED ADULT NURSE REASSESSMENT NOTE - NS ED NURSE REASSESS COMMENT FT1
MICU consult and GI pending.
0640 am Patient had a bloody emesis about a cup size. Patient received IV medications after PIV inserted.
0935 Pt admitted to MICU. Seen by GI recommending ICU. repeat CBC sent to lab/ results pending.
Pt stable at this time. Bed assigned in MICU.

## 2018-03-31 NOTE — ED ADULT NURSE NOTE - OBJECTIVE STATEMENT
Patient presented to ED ambulatory w/parents c/o mild epigastric pain, one episode of vomiting blood 30 minutes before coming to ED, as per patient blood was dark and patient vomited about the size of small salad bowl. Patient also c/o dizziness.

## 2018-03-31 NOTE — ED PROVIDER NOTE - PROGRESS NOTE DETAILS
Resident: Patient is continuing to have hematemesis here. Currently vital signs are wnl. Spoke to private GI, who prefers that we contact house GI for management of variceal bleed. GI fellow paged. -AV Resident: Spoke to [facility] Dr. Michel, who will speak to Dr. Joy directly regarding who is responsible for consult. Spoke to MICU resident, who will see patient. Patient has been moved to critical room, slightly more hypotensive on repeat vitals. -AV OSVALDO Lewis MD : pt endorsed to me by Dr Salinas. reassessed, bp stable at 115 systolic, no active hematemesis, no nausea at this time. pending GI evaluation. received call from Dr Joy (cell ) who states to have house GI see pt. OSVALDO Lewis MD : pt endorsed to me by Dr Salinas. reassessed, bp stable at 115 systolic, no active hematemesis, no nausea at this time. pending GI evaluation. received call from Dr Cornejo (cell ) who states to have house GI see pt.  [id number]: Dr Cornejo states will be here in 10 min, pt to go to Endo with Dr cornejo as well as house GI. recommends protonix gtt. pt w active hematemesis x2 in Ed, remains HD stable. OSVALDO Lewis MD : Dr Joy at bedside to see pt. pt w worsening vomiting, BP 90's systolic, mapping well.

## 2018-03-31 NOTE — CHART NOTE - NSCHARTNOTEFT_GEN_A_CORE
Asked by Dr. Proctor to potentially assist endoscopically for UGIB (hematemesis)  Pt with pre-sinusoidal portal hypertension. No recent abd sono/CT/MRI.   Last EGD with Alberto 2013: izzy carroll tear, portal hypertensive gastropathy, and gastric ulcer.   HgB 12 and 11.2.   Also discussed case extensively with dad, mom, boyfriend at bedside, and our potential role in case. They have my contact information.   Will await notification from Dr. Proctor if our assistance is needed.

## 2018-03-31 NOTE — ED PROVIDER NOTE - PHYSICAL EXAMINATION
Resident:   Gen: pale, of stated age, mild distress  Head: NC, AT  ENT: PERRL, MMM, no uvular deviation, no tonsilar erythema  Neck: supple with full ROM   Chest: CTAB, no retractions, rate normal, appears to breathe comfortably  Heart: RRR S1S2, No peripheral edema, bilateral pulses in arms and legs  Abd: Soft mild epigastric tenderness to palpation, no rebound or guarding  Back: No spinal deformity, no CVAT  Ext: Moving all 4 extremities without obvious impairment to ROM, no obvious weakness  Neuro: fluid speech

## 2018-04-01 ENCOUNTER — TRANSCRIPTION ENCOUNTER (OUTPATIENT)
Age: 23
End: 2018-04-01

## 2018-04-01 DIAGNOSIS — K76.6 PORTAL HYPERTENSION: ICD-10-CM

## 2018-04-01 LAB
ALBUMIN SERPL ELPH-MCNC: 4 G/DL — SIGNIFICANT CHANGE UP (ref 3.3–5)
ALP SERPL-CCNC: 74 U/L — SIGNIFICANT CHANGE UP (ref 40–120)
ALT FLD-CCNC: 18 U/L RC — SIGNIFICANT CHANGE UP (ref 10–45)
ANION GAP SERPL CALC-SCNC: 12 MMOL/L — SIGNIFICANT CHANGE UP (ref 5–17)
AST SERPL-CCNC: 19 U/L — SIGNIFICANT CHANGE UP (ref 10–40)
BILIRUB SERPL-MCNC: 0.8 MG/DL — SIGNIFICANT CHANGE UP (ref 0.2–1.2)
BUN SERPL-MCNC: 8 MG/DL — SIGNIFICANT CHANGE UP (ref 7–23)
CALCIUM SERPL-MCNC: 8.8 MG/DL — SIGNIFICANT CHANGE UP (ref 8.4–10.5)
CHLORIDE SERPL-SCNC: 102 MMOL/L — SIGNIFICANT CHANGE UP (ref 96–108)
CO2 SERPL-SCNC: 26 MMOL/L — SIGNIFICANT CHANGE UP (ref 22–31)
CREAT SERPL-MCNC: 0.71 MG/DL — SIGNIFICANT CHANGE UP (ref 0.5–1.3)
GLUCOSE SERPL-MCNC: 105 MG/DL — HIGH (ref 70–99)
HCT VFR BLD CALC: 28.3 % — LOW (ref 34.5–45)
HCT VFR BLD CALC: 31.8 % — LOW (ref 34.5–45)
HGB BLD-MCNC: 10.4 G/DL — LOW (ref 11.5–15.5)
HGB BLD-MCNC: 9.7 G/DL — LOW (ref 11.5–15.5)
MAGNESIUM SERPL-MCNC: 1.9 MG/DL — SIGNIFICANT CHANGE UP (ref 1.6–2.6)
MCHC RBC-ENTMCNC: 29.7 PG — SIGNIFICANT CHANGE UP (ref 27–34)
MCHC RBC-ENTMCNC: 30.8 PG — SIGNIFICANT CHANGE UP (ref 27–34)
MCHC RBC-ENTMCNC: 32.8 GM/DL — SIGNIFICANT CHANGE UP (ref 32–36)
MCHC RBC-ENTMCNC: 34.3 GM/DL — SIGNIFICANT CHANGE UP (ref 32–36)
MCV RBC AUTO: 89.7 FL — SIGNIFICANT CHANGE UP (ref 80–100)
MCV RBC AUTO: 90.6 FL — SIGNIFICANT CHANGE UP (ref 80–100)
PHOSPHATE SERPL-MCNC: 1.8 MG/DL — LOW (ref 2.5–4.5)
PLATELET # BLD AUTO: 77 K/UL — LOW (ref 150–400)
PLATELET # BLD AUTO: 92 K/UL — LOW (ref 150–400)
POTASSIUM SERPL-MCNC: 3.5 MMOL/L — SIGNIFICANT CHANGE UP (ref 3.5–5.3)
POTASSIUM SERPL-SCNC: 3.5 MMOL/L — SIGNIFICANT CHANGE UP (ref 3.5–5.3)
PROT SERPL-MCNC: 6.8 G/DL — SIGNIFICANT CHANGE UP (ref 6–8.3)
RBC # BLD: 3.15 M/UL — LOW (ref 3.8–5.2)
RBC # BLD: 3.51 M/UL — LOW (ref 3.8–5.2)
RBC # FLD: 13.9 % — SIGNIFICANT CHANGE UP (ref 10.3–14.5)
RBC # FLD: 14.1 % — SIGNIFICANT CHANGE UP (ref 10.3–14.5)
SODIUM SERPL-SCNC: 140 MMOL/L — SIGNIFICANT CHANGE UP (ref 135–145)
WBC # BLD: 5.1 K/UL — SIGNIFICANT CHANGE UP (ref 3.8–10.5)
WBC # BLD: 6.7 K/UL — SIGNIFICANT CHANGE UP (ref 3.8–10.5)
WBC # FLD AUTO: 5.1 K/UL — SIGNIFICANT CHANGE UP (ref 3.8–10.5)
WBC # FLD AUTO: 6.7 K/UL — SIGNIFICANT CHANGE UP (ref 3.8–10.5)

## 2018-04-01 PROCEDURE — 99233 SBSQ HOSP IP/OBS HIGH 50: CPT | Mod: GC

## 2018-04-01 RX ORDER — SODIUM,POTASSIUM PHOSPHATES 278-250MG
1 POWDER IN PACKET (EA) ORAL
Qty: 0 | Refills: 0 | Status: COMPLETED | OUTPATIENT
Start: 2018-04-01 | End: 2018-04-02

## 2018-04-01 RX ADMIN — PANTOPRAZOLE SODIUM 40 MILLIGRAM(S): 20 TABLET, DELAYED RELEASE ORAL at 06:15

## 2018-04-01 RX ADMIN — Medication 0.5 MILLIGRAM(S): at 11:51

## 2018-04-01 RX ADMIN — Medication 1 TABLET(S): at 13:21

## 2018-04-01 RX ADMIN — Medication 0.5 MILLIGRAM(S): at 17:45

## 2018-04-01 RX ADMIN — ESCITALOPRAM OXALATE 40 MILLIGRAM(S): 10 TABLET, FILM COATED ORAL at 13:21

## 2018-04-01 RX ADMIN — Medication 1 TABLET(S): at 17:45

## 2018-04-01 RX ADMIN — Medication 0.5 MILLIGRAM(S): at 06:15

## 2018-04-01 RX ADMIN — PANTOPRAZOLE SODIUM 40 MILLIGRAM(S): 20 TABLET, DELAYED RELEASE ORAL at 17:47

## 2018-04-01 NOTE — DISCHARGE NOTE ADULT - HOSPITAL COURSE
Ms. 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 Initially seen with CBC of 11.2 -> 12.2. Concern was for varices given history and she was started on ceftriaxone, octreotide, and protonix. She was sent to the MICU, intubated, and an EGD was performed. EGD revealed a Dionne-Laura tear and portal hypertensive gastropathy. Octreotide and ceftriaxone was stopped, and she was extubated. She was down-graded to the medical floors. GI recommended abdominal ultrasound to rule out liver disease. On her second day of admission, she was found to have a Hemoglobin of 10.4. Ms. 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 Initially seen with CBC of 11.2 -> 12.2. Concern was for varices given history and she was started on ceftriaxone, octreotide, and protonix. She was sent to the MICU, intubated, and an EGD was performed. EGD revealed a Dionne-Laura tear and portal hypertensive gastropathy. Octreotide and ceftriaxone was stopped, and she was extubated. She was down-graded to the medical floors. GI recommended abdominal ultrasound to rule out liver disease.     She is currently hemodynamically stable with a hgb that has been stable in the 9's. Pt will follow up as an outpt with her gastroenterologist and have a follow up CBC within a week. Ms. 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 Initially seen with CBC of 11.2 -> 12.2. Concern was for varices given history and she was started on ceftriaxone, octreotide, and protonix. She was sent to the MICU, intubated, and an EGD was performed. EGD revealed a Dionne-Laura tear and portal hypertensive gastropathy. Octreotide and ceftriaxone was stopped, and she was extubated. She was down-graded to the medical floors. GI recommended abdominal ultrasound to rule out liver disease. US showed splenomegaly, atrophic left kidney.      She is currently hemodynamically stable with a hgb that has been stable in the 9's. Pt will follow up as an outpt with her gastroenterologist and have a follow up CBC within a week.

## 2018-04-01 NOTE — DISCHARGE NOTE ADULT - OTHER SIGNIFICANT FINDINGS
UPPER ENDOSCOPY Performed with Dr Proctor and Dr. Myrick     IMPRESSION  - Prior scarring in esophagus from prior variceal banding  - Dionne-Laura tear. This is likely the source of bleeding.   - Hematin (altered blood/coffee-ground-like material) in the gastric fundus.   - Mild portal hypertensive gastropathy and gastric erosions.  - Possible non-bleeding gastric varices.   - Normal duodenal bulb and 2nd part of the duodenum.

## 2018-04-01 NOTE — DISCHARGE NOTE ADULT - CARE PLAN
Principal Discharge DX:	Dionne-Laura tear  Secondary Diagnosis:	Portal hypertension Principal Discharge DX:	Dionne-Laura tear  Goal:	Please follow-up with gastroenterology  Secondary Diagnosis:	Portal hypertension  Goal:	Please follow-up with gastroenterology Principal Discharge DX:	Dionne-Laura tear  Goal:	Please follow-up with gastroenterology  Assessment and plan of treatment:	You were admitted to [facility] for upper GI bleed found to be the result of a tear in the esophagus (Dionne-Laura tear) on endoscopy.  Secondary Diagnosis:	Portal hypertension  Goal:	Please follow-up with gastroenterology Principal Discharge DX:	Dionne-Laura tear  Goal:	Please follow-up with gastroenterology  Assessment and plan of treatment:	You were admitted to [facility] for upper GI bleed found to be the result of a tear in the esophagus (Dionne-Laura tear) on endoscopy. This is to say that you bleed from your throat that had to be fixed with an endoscopy. It is important that you follow up with your gastroenterologist within a week of leaving the hospital.  Secondary Diagnosis:	Portal hypertension  Goal:	Please follow-up with gastroenterology  Assessment and plan of treatment:	You have a history of portal hypertension, which is to say that there is an elevated blood pressure in the blood system connecting your GI tract and your liver. Due to this, you are prone to other diseases, such as Dionne-Laura Tears. Please follow up with gastroenterology within a week of leaving the hospital to follow up on an ultrasound of your liver that was performed while you were hospitalized.

## 2018-04-01 NOTE — PROGRESS NOTE ADULT - PROBLEM SELECTOR PLAN 2
c/b portal htn and esophageal varices  - f/u GI recs 2/2 chronic congenital portal vein thrombosis- last seen on sono 5 years ago  - will discuss with OP GI, pt will need ongoing surveillance and management of varices  - will f/u RUQ sono to r/o possible liver source of portal hypertension

## 2018-04-01 NOTE — DISCHARGE NOTE ADULT - PATIENT PORTAL LINK FT
You can access the [facility] Patient Portal, offered by [facility], by registering with the following website: [email or web address]

## 2018-04-01 NOTE — PROGRESS NOTE ADULT - SUBJECTIVE AND OBJECTIVE BOX
Patient is a 23y old  Female who presents with a chief complaint of Upper GI bleed (31 Mar 2018 11:48)      Overnight Events: No acute events  Subjective: Feels well this AM. No addition stomach upset, nausea, vomiting.  General: No fever or chills.  Head: No lightheadedness, dizziness, ha.  Chest: No SOB, cp, palpitations.  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)  NECK: Supple  CHEST/LUNG: Clear to auscultation bilaterally; No wheeze  HEART: Regular rate and rhythm; No murmurs, rubs, or gallops  ABDOMEN: Soft, Nontender, Nondistended; Bowel sounds present  EXTREMITIES: WWP, no edema  PSYCH: AAOx3  NEUROLOGY: non-focal  SKIN: No rashes or lesions    LABS:  CAPILLARY BLOOD GLUCOSE        I&O's Summary    31 Mar 2018 07:01  -  01 Apr 2018 07:00  --------------------------------------------------------  IN: 1322 mL / OUT: 0 mL / NET: 1322 mL                              12.2   12.6  )-----------( 155      ( 31 Mar 2018 12:35 )             35.7     WBC Trend: 12.6<--, 6.0<--, 9.3<--  03-31    135  |  101  |  12  ----------------------------<  162<H>  5.8<H>   |  21<L>  |  0.63    Ca    8.5      31 Mar 2018 12:35  Phos  2.2     03-31  Mg     1.9     03-31    TPro  7.8  /  Alb  4.3  /  TBili  0.8  /  DBili  x   /  AST  52<H>  /  ALT  24  /  AlkPhos  76  03-31    Creatinine Trend: 0.63<--, 0.67<--  PT/INR - ( 31 Mar 2018 12:35 )   PT: 13.8 sec;   INR: 1.27 ratio         PTT - ( 31 Mar 2018 12:35 )  PTT:30.0 sec              MEDICATIONS  (STANDING):  ALPRAZolam 0.5 milliGRAM(s) Oral four times a day  escitalopram 40 milliGRAM(s) Oral daily  HYDROcodone/homatropine Syrup 5 milliLiter(s) Oral four times a day  pantoprazole  Injectable 40 milliGRAM(s) IV Push two times a day    MEDICATIONS  (PRN):  ALPRAZolam 1 milliGRAM(s) Oral two times a day PRN anxiety not controlled by standing alprazolam  ondansetron Injectable 4 milliGRAM(s) IV Push three times a day PRN Nausea and/or Vomiting Patient is a 23y old  Female who presents with a chief complaint of Upper GI bleed (31 Mar 2018 11:48)      Overnight Events: No acute events  Subjective: Feels well this AM. No addition stomach upset, nausea, vomiting.  General: No fever or chills.  Head: No lightheadedness, dizziness, ha.  Chest: No SOB, cp, palpitations.  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; Duane syndrome)  NECK: Supple  CHEST/LUNG: Clear to auscultation bilaterally; No wheeze  HEART: Regular rate and rhythm; No murmurs, rubs, or gallops  ABDOMEN: Soft, Nontender, Nondistended; Bowel sounds present  EXTREMITIES: WWP, no edema  PSYCH: AAOx3  NEUROLOGY: non-focal  SKIN: No rashes or lesions    LABS:  CAPILLARY BLOOD GLUCOSE        I&O's Summary    31 Mar 2018 07:01  -  01 Apr 2018 07:00  --------------------------------------------------------  IN: 1322 mL / OUT: 0 mL / NET: 1322 mL                              12.2   12.6  )-----------( 155      ( 31 Mar 2018 12:35 )             35.7     WBC Trend: 12.6<--, 6.0<--, 9.3<--  03-31    135  |  101  |  12  ----------------------------<  162<H>  5.8<H>   |  21<L>  |  0.63    Ca    8.5      31 Mar 2018 12:35  Phos  2.2     03-31  Mg     1.9     03-31    TPro  7.8  /  Alb  4.3  /  TBili  0.8  /  DBili  x   /  AST  52<H>  /  ALT  24  /  AlkPhos  76  03-31    Creatinine Trend: 0.63<--, 0.67<--  PT/INR - ( 31 Mar 2018 12:35 )   PT: 13.8 sec;   INR: 1.27 ratio         PTT - ( 31 Mar 2018 12:35 )  PTT:30.0 sec              MEDICATIONS  (STANDING):  ALPRAZolam 0.5 milliGRAM(s) Oral four times a day  escitalopram 40 milliGRAM(s) Oral daily  HYDROcodone/homatropine Syrup 5 milliLiter(s) Oral four times a day  pantoprazole  Injectable 40 milliGRAM(s) IV Push two times a day    MEDICATIONS  (PRN):  ALPRAZolam 1 milliGRAM(s) Oral two times a day PRN anxiety not controlled by standing alprazolam  ondansetron Injectable 4 milliGRAM(s) IV Push three times a day PRN Nausea and/or Vomiting

## 2018-04-01 NOTE — DISCHARGE NOTE ADULT - PLAN OF CARE
Please follow-up with gastroenterology You were admitted to [facility] for upper GI bleed found to be the result of a tear in the esophagus (Dionne-Laura tear) on endoscopy. You were admitted to [facility] for upper GI bleed found to be the result of a tear in the esophagus (Dionne-Laura tear) on endoscopy. This is to say that you bleed from your throat that had to be fixed with an endoscopy. It is important that you follow up with your gastroenterologist within a week of leaving the hospital. You have a history of portal hypertension, which is to say that there is an elevated blood pressure in the blood system connecting your GI tract and your liver. Due to this, you are prone to other diseases, such as Dionne-Laura Tears. Please follow up with gastroenterology within a week of leaving the hospital to follow up on an ultrasound of your liver that was performed while you were hospitalized.

## 2018-04-01 NOTE — DISCHARGE NOTE ADULT - MEDICATION SUMMARY - MEDICATIONS TO TAKE
I will START or STAY ON the medications listed below when I get home from the hospital:    Lexapro 20 mg oral tablet  -- 2 tab(s) by mouth once a day  -- Indication: For Depression    Xanax XR 1 mg oral tablet, extended release  -- 1 tab(s) by mouth 2 times a day  -- Indication: For Anxiety    Xanax 1 mg oral tablet  -- 1 tab(s) by mouth 2 times a day, As Needed  -- Indication: For Anxiety    pantoprazole 40 mg oral delayed release tablet  -- 1 tab(s) by mouth once a day  -- Indication: For Dionne-Laura tear I will START or STAY ON the medications listed below when I get home from the hospital:    Lexapro 20 mg oral tablet  -- 2 tab(s) by mouth once a day  -- Indication: For Depression    Xanax XR 1 mg oral tablet, extended release  -- 1 tab(s) by mouth 2 times a day  -- Indication: For Anxiety    Xanax 1 mg oral tablet  -- 1 tab(s) by mouth 2 times a day, As Needed  -- Indication: For Anxiety    pantoprazole 40 mg oral delayed release tablet  -- 1 tab(s) by mouth once a day  -- Indication: For Dionne-Laura tear    HYDROcodone-homatropine 5 mg-1.5 mg/5 mL oral syrup  -- 5 milliliter(s) by mouth 4 times a day, As needed, Cough MDD:4 times.  -- Indication: For Cough

## 2018-04-01 NOTE — DISCHARGE NOTE ADULT - CARE PROVIDER_API CALL
Diaz Zelaya), Pediatrics  [address] [place] [id number]  Phone: [phone]  Fax: [phone] Diaz Zelaya), Pediatrics  [address], [place] [id number]  Phone: [phone]  Fax: [phone]    Wallace Proctor), Gastroenterology; Internal Medicine  [address] [address]  Phone: [phone]  Fax: [phone]

## 2018-04-02 VITALS
HEART RATE: 75 BPM | SYSTOLIC BLOOD PRESSURE: 111 MMHG | RESPIRATION RATE: 18 BRPM | OXYGEN SATURATION: 96 % | TEMPERATURE: 98 F | DIASTOLIC BLOOD PRESSURE: 71 MMHG

## 2018-04-02 LAB
HCT VFR BLD CALC: 26.5 % — LOW (ref 34.5–45)
HGB BLD-MCNC: 9.3 G/DL — LOW (ref 11.5–15.5)
MCHC RBC-ENTMCNC: 31.1 PG — SIGNIFICANT CHANGE UP (ref 27–34)
MCHC RBC-ENTMCNC: 35.2 GM/DL — SIGNIFICANT CHANGE UP (ref 32–36)
MCV RBC AUTO: 88.4 FL — SIGNIFICANT CHANGE UP (ref 80–100)
PLATELET # BLD AUTO: 72 K/UL — LOW (ref 150–400)
RBC # BLD: 2.99 M/UL — LOW (ref 3.8–5.2)
RBC # FLD: 13.8 % — SIGNIFICANT CHANGE UP (ref 10.3–14.5)
WBC # BLD: 4.8 K/UL — SIGNIFICANT CHANGE UP (ref 3.8–10.5)
WBC # FLD AUTO: 4.8 K/UL — SIGNIFICANT CHANGE UP (ref 3.8–10.5)

## 2018-04-02 PROCEDURE — 94002 VENT MGMT INPAT INIT DAY: CPT

## 2018-04-02 PROCEDURE — 99239 HOSP IP/OBS DSCHRG MGMT >30: CPT

## 2018-04-02 PROCEDURE — 84702 CHORIONIC GONADOTROPIN TEST: CPT

## 2018-04-02 PROCEDURE — 82435 ASSAY OF BLOOD CHLORIDE: CPT

## 2018-04-02 PROCEDURE — 85730 THROMBOPLASTIN TIME PARTIAL: CPT

## 2018-04-02 PROCEDURE — 80053 COMPREHEN METABOLIC PANEL: CPT

## 2018-04-02 PROCEDURE — 83605 ASSAY OF LACTIC ACID: CPT

## 2018-04-02 PROCEDURE — 96376 TX/PRO/DX INJ SAME DRUG ADON: CPT

## 2018-04-02 PROCEDURE — 86922 COMPATIBILITY TEST ANTIGLOB: CPT

## 2018-04-02 PROCEDURE — 82947 ASSAY GLUCOSE BLOOD QUANT: CPT

## 2018-04-02 PROCEDURE — 99285 EMERGENCY DEPT VISIT HI MDM: CPT | Mod: 25

## 2018-04-02 PROCEDURE — 76700 US EXAM ABDOM COMPLETE: CPT

## 2018-04-02 PROCEDURE — 96374 THER/PROPH/DIAG INJ IV PUSH: CPT

## 2018-04-02 PROCEDURE — 85027 COMPLETE CBC AUTOMATED: CPT

## 2018-04-02 PROCEDURE — 86850 RBC ANTIBODY SCREEN: CPT

## 2018-04-02 PROCEDURE — 82803 BLOOD GASES ANY COMBINATION: CPT

## 2018-04-02 PROCEDURE — 86900 BLOOD TYPING SEROLOGIC ABO: CPT

## 2018-04-02 PROCEDURE — 84132 ASSAY OF SERUM POTASSIUM: CPT

## 2018-04-02 PROCEDURE — 96375 TX/PRO/DX INJ NEW DRUG ADDON: CPT

## 2018-04-02 PROCEDURE — 85610 PROTHROMBIN TIME: CPT

## 2018-04-02 PROCEDURE — 83735 ASSAY OF MAGNESIUM: CPT

## 2018-04-02 PROCEDURE — 84295 ASSAY OF SERUM SODIUM: CPT

## 2018-04-02 PROCEDURE — 86901 BLOOD TYPING SEROLOGIC RH(D): CPT

## 2018-04-02 PROCEDURE — 93005 ELECTROCARDIOGRAM TRACING: CPT

## 2018-04-02 PROCEDURE — 85014 HEMATOCRIT: CPT

## 2018-04-02 PROCEDURE — 84100 ASSAY OF PHOSPHORUS: CPT

## 2018-04-02 PROCEDURE — 71045 X-RAY EXAM CHEST 1 VIEW: CPT

## 2018-04-02 PROCEDURE — 76700 US EXAM ABDOM COMPLETE: CPT | Mod: 26

## 2018-04-02 PROCEDURE — 82330 ASSAY OF CALCIUM: CPT

## 2018-04-02 RX ORDER — PANTOPRAZOLE SODIUM 20 MG/1
1 TABLET, DELAYED RELEASE ORAL
Qty: 30 | Refills: 0 | OUTPATIENT
Start: 2018-04-02 | End: 2018-05-01

## 2018-04-02 RX ORDER — PANTOPRAZOLE SODIUM 20 MG/1
40 TABLET, DELAYED RELEASE ORAL
Qty: 0 | Refills: 0 | Status: DISCONTINUED | OUTPATIENT
Start: 2018-04-02 | End: 2018-04-02

## 2018-04-02 RX ADMIN — Medication 1 TABLET(S): at 08:54

## 2018-04-02 RX ADMIN — PANTOPRAZOLE SODIUM 40 MILLIGRAM(S): 20 TABLET, DELAYED RELEASE ORAL at 06:27

## 2018-04-02 RX ADMIN — ESCITALOPRAM OXALATE 40 MILLIGRAM(S): 10 TABLET, FILM COATED ORAL at 13:49

## 2018-04-02 RX ADMIN — Medication 0.5 MILLIGRAM(S): at 13:49

## 2018-04-02 NOTE — PROGRESS NOTE ADULT - ATTENDING COMMENTS
Arik Limon MD, FACG, [facility]  [phone]    Patient seen 4/1/18, and documentation completed after MN.
Briefly, 23F with congenital disease presents with hematemesis found to have new Dionne carroll tear s/p banding. H/H now stable without further episodes of hematemesis. Plan for RUQ sonogram and dc home. Pt to follow up with GI as outpatient. Discussed the care with the parents who are in agreement with the plan    51 minutes spent on discharge process    Ananya Fernandez MD  Division of Hospital Medicine  Pager: [phone]  Office: [phone]
I was physically present for the key portions of the evaluation and management (E/M) service provided.  I agree with the above history, physical, and plan which I have reviewed and edited where appropriate.

## 2018-04-02 NOTE — PROGRESS NOTE ADULT - SUBJECTIVE AND OBJECTIVE BOX
Patient is a 23y old  Female who presents with a chief complaint of Upper GI bleed (01 Apr 2018 13:23)      INTERVAL HPI/OVERNIGHT EVENTS:  No hematemesis.  Tolerating regular diet.    MEDICATIONS  (STANDING):  ALPRAZolam 0.5 milliGRAM(s) Oral four times a day  escitalopram 40 milliGRAM(s) Oral daily  pantoprazole  Injectable 40 milliGRAM(s) IV Push two times a day  potassium acid phosphate/sodium acid phosphate tablet (K-PHOS No. 2) 1 Tablet(s) Oral three times a day with meals    MEDICATIONS  (PRN):  ALPRAZolam 1 milliGRAM(s) Oral two times a day PRN anxiety not controlled by standing alprazolam  HYDROcodone/homatropine Syrup 5 milliLiter(s) Oral four times a day PRN Cough  ondansetron Injectable 4 milliGRAM(s) IV Push three times a day PRN Nausea and/or Vomiting      Allergies    No Known Allergies    Intolerances        Review of Systems:  No dysuria, no rash, o/w 10 point ROS negative.    General:  No wt loss, fevers, chills, night sweats,fatigue,   ENT:  No sore throat, pain, runny nose, dysphagia  CV:  No pain, palpitatioins, hypo/hypertension  Resp:  No dyspnea, cough, tachypnea, wheezing  Neuro:  No weakness, tingling, memory problems  Heme:  No petechiae, ecchymosis, easy bruisability  Otherwise 10 point ROS negative    Vital Signs Last 24 Hrs  T(C): 36.8 (01 Apr 2018 22:07), Max: 36.9 (01 Apr 2018 12:20)  T(F): 98.3 (01 Apr 2018 22:07), Max: 98.4 (01 Apr 2018 12:20)  HR: 86 (01 Apr 2018 22:07) (86 - 88)  BP: 114/74 (01 Apr 2018 22:07) (95/60 - 114/74)  BP(mean): --  RR: 18 (01 Apr 2018 22:07) (18 - 18)  SpO2: 100% (01 Apr 2018 22:07) (99% - 100%)    PHYSICAL EXAM:    Constitutional: NAD, well-developed  Neck: No LAD, supple  Respiratory: clear to auscultation b/l no rales, rhonchi, wheezing  Cardiovascular: S1 and S2, RRR, no murmur  Gastrointestinal: +BS x4, soft, NT/ND, neg HSM,  Extremities: No peripheral edema, neg clubbing, cyanosis  Vascular: 2+ peripheral pulses  Neurological: A/O x 3, no focal deficits  Psychiatric: Normal mood, normal affect  Skin: No rashes    LABS:                        9.7    5.1   )-----------( 77       ( 01 Apr 2018 17:59 )             28.3     04-01    140  |  102  |  8   ----------------------------<  105<H>  3.5   |  26  |  0.71    Ca    8.8      01 Apr 2018 09:27  Phos  1.8     04-01  Mg     1.9     04-01    TPro  6.8  /  Alb  4.0  /  TBili  0.8  /  DBili  x   /  AST  19  /  ALT  18  /  AlkPhos  74  04-01    PT/INR - ( 31 Mar 2018 12:35 )   PT: 13.8 sec;   INR: 1.27 ratio         PTT - ( 31 Mar 2018 12:35 )  PTT:30.0 sec    LIVER FUNCTIONS - ( 01 Apr 2018 09:27 )  Alb: 4.0 g/dL / Pro: 6.8 g/dL / ALK PHOS: 74 U/L / ALT: 18 U/L RC / AST: 19 U/L / GGT: x             RADIOLOGY & ADDITIONAL TESTS:

## 2018-04-02 NOTE — PROGRESS NOTE ADULT - PROBLEM SELECTOR PLAN 6
VTE: [facility]  Diet: Regular (NPO for today)  Dispo: home    MATT Danielle MD-[id number]  Internal Medicine Department  Pager: [phone] / [phone]
[facility]

## 2018-04-02 NOTE — PROGRESS NOTE ADULT - PROBLEM SELECTOR PLAN 1
Mallary Laura tear  - Status post EGD  - Patient was intubated and extubated in the MICU  - c/w protonix IV BID for now  - f/u GI recs  - continue to monitor CBC  - zofran 4 mg IV TID. Check EKG for QTc prolongation  - Monitor CBC, transfuse as needed to goal Hb>7  - Abdominal U/S (r/o liver disease) pending
Mallary Laura tear  - Status post EGD  - Patient was intubated and extubated in the MICU  - c/w protonix IV BID for now  - f/u GI recs  - continue to monitor CBC  - zofran 4 mg IV TID. Check EKG for QTc prolongation  - Monitor CBC, transfuse as needed to goal Hb>7  - Abdominal U/S (r/o liver disease) pending

## 2018-04-02 NOTE — PROGRESS NOTE ADULT - SUBJECTIVE AND OBJECTIVE BOX
CLEO YE  23y  Female      Patient is a 23y old  Female who presents with a chief complaint of Upper GI bleed (01 Apr 2018 13:23)      INTERVAL HPI/OVERNIGHT EVENTS:  o/n: no acute events  -pt reports feeling fine    Medications:  ALPRAZolam 0.5 milliGRAM(s) Oral four times a day  ALPRAZolam 1 milliGRAM(s) Oral two times a day PRN  escitalopram 40 milliGRAM(s) Oral daily  HYDROcodone/homatropine Syrup 5 milliLiter(s) Oral four times a day PRN  ondansetron Injectable 4 milliGRAM(s) IV Push three times a day PRN  pantoprazole    Tablet 40 milliGRAM(s) Oral before breakfast      REVIEW OF SYSTEMS:  Subjective: Feels well this AM. No addition stomach upset, nausea, vomiting.  General: No fever or chills.  Head: No lightheadedness, dizziness, ha.  Chest: No SOB, cp, palpitations.  Abdomen: No n/v,  no d/c, no dysuria  Extremities: No swelling  Skin: No rashes, pruritis    T(C): 36.7 (04-02-18 @ 04:31), Max: 36.9 (04-01-18 @ 12:20)  HR: 76 (04-02-18 @ 04:31) (76 - 88)  BP: 109/71 (04-02-18 @ 04:31) (103/66 - 114/74)  RR: 18 (04-02-18 @ 04:31) (18 - 18)  SpO2: 98% (04-02-18 @ 04:31) (98% - 100%)  Wt(kg): --Vital Signs Last 24 Hrs  T(C): 36.7 (02 Apr 2018 04:31), Max: 36.9 (01 Apr 2018 12:20)  T(F): 98.1 (02 Apr 2018 04:31), Max: 98.4 (01 Apr 2018 12:20)  HR: 76 (02 Apr 2018 04:31) (76 - 88)  BP: 109/71 (02 Apr 2018 04:31) (103/66 - 114/74)  BP(mean): --  RR: 18 (02 Apr 2018 04:31) (18 - 18)  SpO2: 98% (02 Apr 2018 04:31) (98% - 100%)    PHYSICAL EXAM:  GENERAL: NAD, well-developed  HEAD:  Atraumatic, Normocephalic  EYES: PERRLA, conjunctiva and sclera clear, 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; Duane syndrome)  NECK: Supple  CHEST/LUNG: Clear to auscultation bilaterally; No wheeze  HEART: Regular rate and rhythm; No murmurs, rubs, or gallops  ABDOMEN: Soft, Nontender, Nondistended; Bowel sounds present  EXTREMITIES: WWP, no edema  PSYCH: AAOx3  NEUROLOGY: non-focal  SKIN: No rashes or lesions    Consultant(s) Notes Reviewed:  [x ] YES  [ ] NO  Care Discussed with Consultants/Other Providers [ x] YES  [ ] NO    LABS:                        9.3    4.8   )-----------( 72       ( 02 Apr 2018 07:03 )             26.5     04-01    140  |  102  |  8   ----------------------------<  105<H>  3.5   |  26  |  0.71    Ca    8.8      01 Apr 2018 09:27  Phos  1.8     04-01  Mg     1.9     04-01    TPro  6.8  /  Alb  4.0  /  TBili  0.8  /  DBili  x   /  AST  19  /  ALT  18  /  AlkPhos  74  04-01    PT/INR - ( 31 Mar 2018 12:35 )   PT: 13.8 sec;   INR: 1.27 ratio         PTT - ( 31 Mar 2018 12:35 )  PTT:30.0 sec      HEALTH ISSUES - PROBLEM Dx:  Portal hypertension: Portal hypertension  Prophylactic measure: Prophylactic measure  Anxiety: Anxiety  Depression: Depression  Thrombocytopenia: Thrombocytopenia  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

## 2018-04-02 NOTE — PROGRESS NOTE ADULT - ASSESSMENT
23 y.o. female with congenital liver disease with a history of portal vein thrombosis, esophageal varices (at age 1 s/p banding), portal hypertension, ASD s/p repair, anxiety/depression presenting with hematemesis.  Had EGD yesterday with Dr. Proctor and GI fellow.  By report, EGD showed grade I varices, no stigmata of hemorrhage, no banding required, and also showed Dionne-Wiess tear.  Hgb 12.2 to 9.7    Plan:  1.  Continue IV protonix.  2.  OK to change to po protonix tomorrow.  3.  Continue regular diet.  4.  OK for d/c planning Monday 4/2/18 if H/H stable.  5.  Follow-up outpatient.
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

## 2018-04-02 NOTE — PROGRESS NOTE ADULT - PROBLEM SELECTOR PLAN 2
2/2 chronic congenital portal vein thrombosis- last seen on sono 5 years ago  - will discuss with OP GI, pt will need ongoing surveillance and management of varices  - will f/u RUQ sono to r/o possible liver source of portal hypertension

## 2018-06-18 ENCOUNTER — APPOINTMENT (OUTPATIENT)
Dept: HEPATOLOGY | Facility: CLINIC | Age: 23
End: 2018-06-18
Payer: COMMERCIAL

## 2018-06-18 VITALS
SYSTOLIC BLOOD PRESSURE: 114 MMHG | DIASTOLIC BLOOD PRESSURE: 78 MMHG | HEIGHT: 59 IN | OXYGEN SATURATION: 95 % | RESPIRATION RATE: 13 BRPM | HEART RATE: 88 BPM | TEMPERATURE: 97.7 F | WEIGHT: 137 LBS | BODY MASS INDEX: 27.62 KG/M2

## 2018-06-18 DIAGNOSIS — D69.6 THROMBOCYTOPENIA, UNSPECIFIED: ICD-10-CM

## 2018-06-18 PROCEDURE — 99204 OFFICE O/P NEW MOD 45 MIN: CPT

## 2018-06-29 ENCOUNTER — LABORATORY RESULT (OUTPATIENT)
Age: 23
End: 2018-06-29

## 2018-06-29 LAB
BASOPHILS # BLD AUTO: 0.02 K/UL
BASOPHILS NFR BLD AUTO: 0.4 %
EOSINOPHIL # BLD AUTO: 0.06 K/UL
EOSINOPHIL NFR BLD AUTO: 1.3 %
HCT VFR BLD CALC: 33.5 %
HGB BLD-MCNC: 10.7 G/DL
IMM GRANULOCYTES NFR BLD AUTO: 1.3 %
INR PPP: 1.12 RATIO
LYMPHOCYTES # BLD AUTO: 1.5 K/UL
LYMPHOCYTES NFR BLD AUTO: 32.5 %
MAN DIFF?: NORMAL
MCHC RBC-ENTMCNC: 25.8 PG
MCHC RBC-ENTMCNC: 31.9 GM/DL
MCV RBC AUTO: 80.9 FL
MONOCYTES # BLD AUTO: 0.37 K/UL
MONOCYTES NFR BLD AUTO: 8 %
NEUTROPHILS # BLD AUTO: 2.61 K/UL
NEUTROPHILS NFR BLD AUTO: 56.5 %
PLATELET # BLD AUTO: 95 K/UL
PT BLD: 12.7 SEC
RBC # BLD: 4.14 M/UL
RBC # FLD: 15.5 %
WBC # FLD AUTO: 4.62 K/UL

## 2018-07-02 LAB
ALBUMIN SERPL ELPH-MCNC: 4.2 G/DL
ALP BLD-CCNC: 77 U/L
ALT SERPL-CCNC: 42 U/L
ANION GAP SERPL CALC-SCNC: 16 MMOL/L
AST SERPL-CCNC: 29 U/L
BILIRUB SERPL-MCNC: 0.5 MG/DL
BUN SERPL-MCNC: 9 MG/DL
CALCIUM SERPL-MCNC: 9.1 MG/DL
CHLORIDE SERPL-SCNC: 102 MMOL/L
CO2 SERPL-SCNC: 20 MMOL/L
CREAT SERPL-MCNC: 0.73 MG/DL
GLUCOSE SERPL-MCNC: 161 MG/DL
HBV CORE IGG+IGM SER QL: NONREACTIVE
HBV SURFACE AB SER QL: NONREACTIVE
HBV SURFACE AG SER QL: NONREACTIVE
HCV AB SER QL: NONREACTIVE
HCV S/CO RATIO: 0.19 S/CO
POTASSIUM SERPL-SCNC: 4 MMOL/L
PROT SERPL-MCNC: 7.1 G/DL
SODIUM SERPL-SCNC: 138 MMOL/L

## 2018-07-10 NOTE — PATIENT PROFILE ADULT. - CENTRAL VENOUS CATHETER
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
no

## 2018-08-06 ENCOUNTER — CHART COPY (OUTPATIENT)
Age: 23
End: 2018-08-06

## 2018-08-06 ENCOUNTER — INPATIENT (INPATIENT)
Facility: HOSPITAL | Age: 23
LOS: 0 days | Discharge: HOME | DRG: 811 | End: 2018-08-07
Attending: HOSPITALIST | Admitting: INTERNAL MEDICINE
Payer: COMMERCIAL

## 2018-08-06 VITALS
TEMPERATURE: 98 F | WEIGHT: 134.92 LBS | RESPIRATION RATE: 19 BRPM | DIASTOLIC BLOOD PRESSURE: 74 MMHG | HEART RATE: 97 BPM | OXYGEN SATURATION: 99 % | HEIGHT: 60 IN | SYSTOLIC BLOOD PRESSURE: 111 MMHG

## 2018-08-06 DIAGNOSIS — D50.0 IRON DEFICIENCY ANEMIA SECONDARY TO BLOOD LOSS (CHRONIC): ICD-10-CM

## 2018-08-06 LAB
ALBUMIN SERPL ELPH-MCNC: 4.5 G/DL — SIGNIFICANT CHANGE UP (ref 3.3–5)
ALP SERPL-CCNC: 74 U/L — SIGNIFICANT CHANGE UP (ref 40–120)
ALT FLD-CCNC: 19 U/L — SIGNIFICANT CHANGE UP (ref 10–45)
ANION GAP SERPL CALC-SCNC: 12 MMOL/L — SIGNIFICANT CHANGE UP (ref 5–17)
APTT BLD: 28.4 SEC — SIGNIFICANT CHANGE UP (ref 27.5–37.4)
AST SERPL-CCNC: 23 U/L — SIGNIFICANT CHANGE UP (ref 10–40)
BASOPHILS # BLD AUTO: 0 K/UL — SIGNIFICANT CHANGE UP (ref 0–0.2)
BASOPHILS NFR BLD AUTO: 0.4 % — SIGNIFICANT CHANGE UP (ref 0–2)
BILIRUB SERPL-MCNC: 0.6 MG/DL — SIGNIFICANT CHANGE UP (ref 0.2–1.2)
BLD GP AB SCN SERPL QL: NEGATIVE — SIGNIFICANT CHANGE UP
BLD GP AB SCN SERPL QL: POSITIVE — SIGNIFICANT CHANGE UP
BUN SERPL-MCNC: 10 MG/DL — SIGNIFICANT CHANGE UP (ref 7–23)
CALCIUM SERPL-MCNC: 8.8 MG/DL — SIGNIFICANT CHANGE UP (ref 8.4–10.5)
CHLORIDE SERPL-SCNC: 99 MMOL/L — SIGNIFICANT CHANGE UP (ref 96–108)
CO2 SERPL-SCNC: 25 MMOL/L — SIGNIFICANT CHANGE UP (ref 22–31)
CREAT SERPL-MCNC: 0.76 MG/DL — SIGNIFICANT CHANGE UP (ref 0.5–1.3)
EOSINOPHIL # BLD AUTO: 0.1 K/UL — SIGNIFICANT CHANGE UP (ref 0–0.5)
EOSINOPHIL NFR BLD AUTO: 1 % — SIGNIFICANT CHANGE UP (ref 0–6)
GLUCOSE SERPL-MCNC: 93 MG/DL — SIGNIFICANT CHANGE UP (ref 70–99)
HCT VFR BLD CALC: 22.8 % — LOW (ref 34.5–45)
HGB BLD-MCNC: 7.4 G/DL — LOW (ref 11.5–15.5)
INR BLD: 1.18 RATIO — HIGH (ref 0.88–1.16)
LYMPHOCYTES # BLD AUTO: 1.5 K/UL — SIGNIFICANT CHANGE UP (ref 1–3.3)
LYMPHOCYTES # BLD AUTO: 30.5 % — SIGNIFICANT CHANGE UP (ref 13–44)
MCHC RBC-ENTMCNC: 27 PG — SIGNIFICANT CHANGE UP (ref 27–34)
MCHC RBC-ENTMCNC: 32.5 GM/DL — SIGNIFICANT CHANGE UP (ref 32–36)
MCV RBC AUTO: 82.9 FL — SIGNIFICANT CHANGE UP (ref 80–100)
MONOCYTES # BLD AUTO: 0.4 K/UL — SIGNIFICANT CHANGE UP (ref 0–0.9)
MONOCYTES NFR BLD AUTO: 7.8 % — SIGNIFICANT CHANGE UP (ref 2–14)
NEUTROPHILS # BLD AUTO: 3 K/UL — SIGNIFICANT CHANGE UP (ref 1.8–7.4)
NEUTROPHILS NFR BLD AUTO: 60.3 % — SIGNIFICANT CHANGE UP (ref 43–77)
PLATELET # BLD AUTO: 105 K/UL — LOW (ref 150–400)
POTASSIUM SERPL-MCNC: 3.6 MMOL/L — SIGNIFICANT CHANGE UP (ref 3.5–5.3)
POTASSIUM SERPL-SCNC: 3.6 MMOL/L — SIGNIFICANT CHANGE UP (ref 3.5–5.3)
PROT SERPL-MCNC: 6.8 G/DL — SIGNIFICANT CHANGE UP (ref 6–8.3)
PROTHROM AB SERPL-ACNC: 12.9 SEC — HIGH (ref 9.8–12.7)
RBC # BLD: 2.75 M/UL — LOW (ref 3.8–5.2)
RBC # FLD: 16.6 % — HIGH (ref 10.3–14.5)
RH IG SCN BLD-IMP: POSITIVE — SIGNIFICANT CHANGE UP
SODIUM SERPL-SCNC: 136 MMOL/L — SIGNIFICANT CHANGE UP (ref 135–145)
WBC # BLD: 5 K/UL — SIGNIFICANT CHANGE UP (ref 3.8–10.5)
WBC # FLD AUTO: 5 K/UL — SIGNIFICANT CHANGE UP (ref 3.8–10.5)

## 2018-08-06 PROCEDURE — 99285 EMERGENCY DEPT VISIT HI MDM: CPT

## 2018-08-06 PROCEDURE — 99223 1ST HOSP IP/OBS HIGH 75: CPT | Mod: GC

## 2018-08-06 NOTE — ED PROVIDER NOTE - MEDICAL DECISION MAKING DETAILS
24 y/o F w/ symptomatic anemia. Likely 2/2 to her izzy vlad tears. Will transfuse PRBCs, and reasess.

## 2018-08-06 NOTE — ED PROVIDER NOTE - PHYSICAL EXAMINATION
Gen: NAD, AOx3, able to make her needs known, non-toxic //            Head: NCAT //            HEENT: oral mucosa moist, conjunctival pallor //            Lung: CTAB, no respiratory distress, no wheezes/rhonchi/rales B/L, speaking in full sentences. //            CV: RRR, no murmurs or rubs //            Abd: soft, NTND, no guarding //            MSK: no visible deformities //            Neuro: No focal sensory or motor deficits //            Skin: Warm, well perfused //            Psych: normal affect.

## 2018-08-06 NOTE — ED ADULT NURSE NOTE - OBJECTIVE STATEMENT
pt had blood drawn at her pmd  she was told she has a low h/h  pt has no bleeding and says she feels fine  she has congenital visual problems as well

## 2018-08-06 NOTE — ED ADULT TRIAGE NOTE - CHIEF COMPLAINT QUOTE
sent by primary care for low h/h- pt denies bleeding at this time. Pt states that she takes iron pills daily

## 2018-08-06 NOTE — ED PROVIDER NOTE - OBJECTIVE STATEMENT
24 y/o F w/ iron deficiency anemia, esophageal varices, and hx of izzy vlad tear presenting to the ED due to abnormal lab values. Pt has been getting serial CBCs due to her chronic anemia 24 y/o F w/ iron deficiency anemia, esophageal varices, and hx of izzy vlad tear presenting to the ED due to abnormal lab values. Pt has been getting serial CBCs due to her chronic anemia and was told by her physician to come in to the ED because her most recent blood test showed her hemoglobin was 7. Pt admits to increased fatigue and shortness of breath. Denies vomiting blood. States she occasionally has dark stools but for the past few days they have been normal. No additional complaints at this time. States she is scheduled for EGD this Friday.

## 2018-08-06 NOTE — ED PROVIDER NOTE - NS ED ROS FT
GENERAL: No fever or chills. increased fatigue//             EYES: no change in vision, //             HEENT: no trouble swallowing or speaking, //             CARDIAC: no chest pain, //              PULMONARY: no cough, +SOB, //             GI: no abdominal pain, no nausea or no vomiting, no diarrhea or constipation, //             : No changes in urination,  //            SKIN: no rashes,  //            NEURO: no headache,  //             MSK: No joint pain otherwise as HPI or negative.

## 2018-08-06 NOTE — ED ADULT NURSE NOTE - NSIMPLEMENTINTERV_GEN_ALL_ED
Implemented All Universal Safety Interventions:  [place] to call system. Call bell, personal items and telephone within reach. Instruct patient to call for assistance. Room bathroom lighting operational. Non-slip footwear when patient is off stretcher. Physically safe environment: no spills, clutter or unnecessary equipment. Stretcher in lowest position, wheels locked, appropriate side rails in place.

## 2018-08-06 NOTE — ED PROVIDER NOTE - ATTENDING CONTRIBUTION TO CARE
Attending Note ([place]): patient with history of upper gi bleeding coming in with symptomatic anemia.  complaining of feeling fatigued.  patient has gi follow up.  reports melena dark days ago.  transfuse, admit for endoscopy

## 2018-08-06 NOTE — ED ADULT NURSE NOTE - ED STAT RN HANDOFF DETAILS
Report given to CHANO Hawkins. Pt A&Ox4. No c/o pain. NPO. voiding well. Repeat CBC drawn and sent Report given to CHANO Orosco. Pt A&Ox4. No c/o pain. NPO. voiding well. Repeat CBC drawn and sent

## 2018-08-06 NOTE — ED PROVIDER NOTE - PROGRESS NOTE DETAILS
Anisha: Pt refused her transfusion and states she is doing well. Anisha: Pt's mother got in touch with her GI physician who contacted the inhouse GI fellow. Fellow contacted ED physicians and informed us that pt would get EGD performed tomorrow.

## 2018-08-06 NOTE — ED ADULT NURSE REASSESSMENT NOTE - NS ED NURSE REASSESS COMMENT FT1
1 unit of PRBC's given. Consent in chart. Risks and benefits explained to patient. Patient verbalized understanding of risks and benefits. Patient aware of possible side effects. Vital signs stable. Second RN Mindy Berry at bedside for confirmation.

## 2018-08-07 ENCOUNTER — TRANSCRIPTION ENCOUNTER (OUTPATIENT)
Age: 23
End: 2018-08-07

## 2018-08-07 VITALS
SYSTOLIC BLOOD PRESSURE: 108 MMHG | OXYGEN SATURATION: 100 % | TEMPERATURE: 98 F | DIASTOLIC BLOOD PRESSURE: 73 MMHG | HEART RATE: 80 BPM | RESPIRATION RATE: 17 BRPM

## 2018-08-07 DIAGNOSIS — Z29.9 ENCOUNTER FOR PROPHYLACTIC MEASURES, UNSPECIFIED: ICD-10-CM

## 2018-08-07 DIAGNOSIS — F41.9 ANXIETY DISORDER, UNSPECIFIED: ICD-10-CM

## 2018-08-07 DIAGNOSIS — D64.9 ANEMIA, UNSPECIFIED: ICD-10-CM

## 2018-08-07 DIAGNOSIS — K92.2 GASTROINTESTINAL HEMORRHAGE, UNSPECIFIED: ICD-10-CM

## 2018-08-07 DIAGNOSIS — D62 ACUTE POSTHEMORRHAGIC ANEMIA: ICD-10-CM

## 2018-08-07 DIAGNOSIS — D69.6 THROMBOCYTOPENIA, UNSPECIFIED: ICD-10-CM

## 2018-08-07 DIAGNOSIS — I85.00 ESOPHAGEAL VARICES WITHOUT BLEEDING: ICD-10-CM

## 2018-08-07 LAB
ANION GAP SERPL CALC-SCNC: 11 MMOL/L — SIGNIFICANT CHANGE UP (ref 5–17)
APTT BLD: 29.2 SEC — SIGNIFICANT CHANGE UP (ref 27.5–37.4)
BUN SERPL-MCNC: 8 MG/DL — SIGNIFICANT CHANGE UP (ref 7–23)
CALCIUM SERPL-MCNC: 8.1 MG/DL — LOW (ref 8.4–10.5)
CHLORIDE SERPL-SCNC: 106 MMOL/L — SIGNIFICANT CHANGE UP (ref 96–108)
CO2 SERPL-SCNC: 24 MMOL/L — SIGNIFICANT CHANGE UP (ref 22–31)
CREAT SERPL-MCNC: 0.73 MG/DL — SIGNIFICANT CHANGE UP (ref 0.5–1.3)
GLUCOSE SERPL-MCNC: 90 MG/DL — SIGNIFICANT CHANGE UP (ref 70–99)
HCT VFR BLD CALC: 25.9 % — LOW (ref 34.5–45)
HCT VFR BLD CALC: 26 % — LOW (ref 34.5–45)
HGB BLD-MCNC: 8.4 G/DL — LOW (ref 11.5–15.5)
HGB BLD-MCNC: 8.5 G/DL — LOW (ref 11.5–15.5)
INR BLD: 1.23 RATIO — HIGH (ref 0.88–1.16)
MCHC RBC-ENTMCNC: 27.1 PG — SIGNIFICANT CHANGE UP (ref 27–34)
MCHC RBC-ENTMCNC: 27.2 PG — SIGNIFICANT CHANGE UP (ref 27–34)
MCHC RBC-ENTMCNC: 32.5 GM/DL — SIGNIFICANT CHANGE UP (ref 32–36)
MCHC RBC-ENTMCNC: 32.7 GM/DL — SIGNIFICANT CHANGE UP (ref 32–36)
MCV RBC AUTO: 83.2 FL — SIGNIFICANT CHANGE UP (ref 80–100)
MCV RBC AUTO: 83.3 FL — SIGNIFICANT CHANGE UP (ref 80–100)
PLATELET # BLD AUTO: 101 K/UL — LOW (ref 150–400)
PLATELET # BLD AUTO: 103 K/UL — LOW (ref 150–400)
POTASSIUM SERPL-MCNC: 3.6 MMOL/L — SIGNIFICANT CHANGE UP (ref 3.5–5.3)
POTASSIUM SERPL-SCNC: 3.6 MMOL/L — SIGNIFICANT CHANGE UP (ref 3.5–5.3)
PROTHROM AB SERPL-ACNC: 13.3 SEC — HIGH (ref 9.8–12.7)
RBC # BLD: 3.11 M/UL — LOW (ref 3.8–5.2)
RBC # BLD: 3.12 M/UL — LOW (ref 3.8–5.2)
RBC # FLD: 16.1 % — HIGH (ref 10.3–14.5)
RBC # FLD: 16.3 % — HIGH (ref 10.3–14.5)
SODIUM SERPL-SCNC: 141 MMOL/L — SIGNIFICANT CHANGE UP (ref 135–145)
WBC # BLD: 3.5 K/UL — LOW (ref 3.8–10.5)
WBC # BLD: 5.6 K/UL — SIGNIFICANT CHANGE UP (ref 3.8–10.5)
WBC # FLD AUTO: 3.5 K/UL — LOW (ref 3.8–10.5)
WBC # FLD AUTO: 5.6 K/UL — SIGNIFICANT CHANGE UP (ref 3.8–10.5)

## 2018-08-07 PROCEDURE — P9040: CPT

## 2018-08-07 PROCEDURE — 86850 RBC ANTIBODY SCREEN: CPT

## 2018-08-07 PROCEDURE — 85610 PROTHROMBIN TIME: CPT

## 2018-08-07 PROCEDURE — 93005 ELECTROCARDIOGRAM TRACING: CPT

## 2018-08-07 PROCEDURE — 80053 COMPREHEN METABOLIC PANEL: CPT

## 2018-08-07 PROCEDURE — 85730 THROMBOPLASTIN TIME PARTIAL: CPT

## 2018-08-07 PROCEDURE — 99239 HOSP IP/OBS DSCHRG MGMT >30: CPT

## 2018-08-07 PROCEDURE — 43235 EGD DIAGNOSTIC BRUSH WASH: CPT | Mod: GC

## 2018-08-07 PROCEDURE — 85027 COMPLETE CBC AUTOMATED: CPT

## 2018-08-07 PROCEDURE — 99285 EMERGENCY DEPT VISIT HI MDM: CPT | Mod: 25

## 2018-08-07 PROCEDURE — 86870 RBC ANTIBODY IDENTIFICATION: CPT

## 2018-08-07 PROCEDURE — 86901 BLOOD TYPING SEROLOGIC RH(D): CPT

## 2018-08-07 PROCEDURE — 86922 COMPATIBILITY TEST ANTIGLOB: CPT

## 2018-08-07 PROCEDURE — 36430 TRANSFUSION BLD/BLD COMPNT: CPT

## 2018-08-07 PROCEDURE — 80048 BASIC METABOLIC PNL TOTAL CA: CPT

## 2018-08-07 PROCEDURE — 86902 BLOOD TYPE ANTIGEN DONOR EA: CPT

## 2018-08-07 PROCEDURE — 86900 BLOOD TYPING SEROLOGIC ABO: CPT

## 2018-08-07 RX ORDER — ESCITALOPRAM OXALATE 10 MG/1
20 TABLET, FILM COATED ORAL DAILY
Qty: 0 | Refills: 0 | Status: DISCONTINUED | OUTPATIENT
Start: 2018-08-07 | End: 2018-08-07

## 2018-08-07 RX ORDER — ALPRAZOLAM 0.25 MG
1 TABLET ORAL
Qty: 0 | Refills: 0 | COMMUNITY

## 2018-08-07 RX ORDER — PANTOPRAZOLE SODIUM 20 MG/1
40 TABLET, DELAYED RELEASE ORAL DAILY
Qty: 0 | Refills: 0 | Status: DISCONTINUED | OUTPATIENT
Start: 2018-08-07 | End: 2018-08-07

## 2018-08-07 RX ORDER — PANTOPRAZOLE SODIUM 20 MG/1
1 TABLET, DELAYED RELEASE ORAL
Qty: 30 | Refills: 0
Start: 2018-08-07 | End: 2018-09-05

## 2018-08-07 RX ORDER — PANTOPRAZOLE SODIUM 20 MG/1
80 TABLET, DELAYED RELEASE ORAL ONCE
Qty: 0 | Refills: 0 | Status: COMPLETED | OUTPATIENT
Start: 2018-08-07 | End: 2018-08-07

## 2018-08-07 RX ORDER — PANTOPRAZOLE SODIUM 20 MG/1
40 TABLET, DELAYED RELEASE ORAL
Qty: 0 | Refills: 0 | Status: DISCONTINUED | OUTPATIENT
Start: 2018-08-07 | End: 2018-08-07

## 2018-08-07 RX ORDER — FERROUS SULFATE 325(65) MG
0 TABLET ORAL
Qty: 0 | Refills: 0 | COMMUNITY

## 2018-08-07 RX ORDER — ACETAMINOPHEN 500 MG
650 TABLET ORAL EVERY 6 HOURS
Qty: 0 | Refills: 0 | Status: DISCONTINUED | OUTPATIENT
Start: 2018-08-07 | End: 2018-08-07

## 2018-08-07 RX ORDER — SODIUM CHLORIDE 9 MG/ML
1000 INJECTION INTRAMUSCULAR; INTRAVENOUS; SUBCUTANEOUS
Qty: 0 | Refills: 0 | Status: DISCONTINUED | OUTPATIENT
Start: 2018-08-07 | End: 2018-08-07

## 2018-08-07 RX ORDER — ALPRAZOLAM 0.25 MG
1 TABLET ORAL
Qty: 0 | Refills: 0 | Status: DISCONTINUED | OUTPATIENT
Start: 2018-08-07 | End: 2018-08-07

## 2018-08-07 RX ADMIN — SODIUM CHLORIDE 150 MILLILITER(S): 9 INJECTION INTRAMUSCULAR; INTRAVENOUS; SUBCUTANEOUS at 02:26

## 2018-08-07 RX ADMIN — PANTOPRAZOLE SODIUM 80 MILLIGRAM(S): 20 TABLET, DELAYED RELEASE ORAL at 04:39

## 2018-08-07 RX ADMIN — Medication 650 MILLIGRAM(S): at 01:38

## 2018-08-07 RX ADMIN — Medication 1 MILLIGRAM(S): at 02:57

## 2018-08-07 RX ADMIN — ESCITALOPRAM OXALATE 20 MILLIGRAM(S): 10 TABLET, FILM COATED ORAL at 02:57

## 2018-08-07 RX ADMIN — Medication 650 MILLIGRAM(S): at 02:33

## 2018-08-07 NOTE — DISCHARGE NOTE ADULT - MEDICATION SUMMARY - MEDICATIONS TO TAKE
I will START or STAY ON the medications listed below when I get home from the hospital:    Lexapro 20 mg oral tablet  -- 2 tab(s) by mouth once a day  -- Indication: For Anxiety    Xanax 1 mg oral tablet  -- 1 tab(s) by mouth 3 times a day, As Needed  -- Indication: For Anxiety    Xanax XR 1 mg oral tablet, extended release  -- 1 tab(s) by mouth once a day (at bedtime)  -- Indication: For Anxiety    ferrous sulfate 325 mg (65 mg elemental iron) oral tablet  -- 1 tab(s) by mouth once a day  -- Indication: For Anemia, unspecified type    sucralfate 1 g oral tablet  -- 1 tab(s) by mouth 4 times a day  -- Indication: For Upper GI bleed    Melatonin  -- 1 tab(s) by mouth once a day (at bedtime), As Needed  -- Indication: For Insomnia    pantoprazole 40 mg oral delayed release tablet  -- 1 tab(s) by mouth once a day   -- It is very important that you take or use this exactly as directed.  Do not skip doses or discontinue unless directed by your doctor.  Obtain medical advice before taking any non-prescription drugs as some may affect the action of this medication.  Swallow whole.  Do not crush.    -- Indication: For Upper GI bleed    Hycodan 5 mg-1.5 mg/5 mL oral syrup  -- 5 milliliter(s) by mouth once a day (at bedtime)  -- Indication: For cough

## 2018-08-07 NOTE — H&P ADULT - PROBLEM SELECTOR PLAN 3
-c/w with Lexapro and Xanax  -Confirmed Xanax with pharmacist via Istop 1mg BID Pt with worsening anemia with hx of dark stools, an episode of coffee ground emesis 2 weeks ago; suspect 2/2 PUD vs gastropathy in setting of portal HTN, less likely esophageal variceal bleed or Dionne carroll tear given no hematemesis   -s/p 1Unit pRBC  -Protonix 80mg IV, followed by 40mg IV BID  - GI following - plan for EGD today, keep NPO; maintenance IVF   - monitor CBC q8h  - At least 2x IV access, 18 gauge or larger pt with hx of esophageal varices 2/2 portal HTN 2/2 congenital liver disease with PVT; s/p banding at young age, no recent variceal bleeds  do not suspect current bleed 2/2 varices, hold off on octreotide for now   monitor CBC  f/u GI recs

## 2018-08-07 NOTE — H&P ADULT - NSHPPHYSICALEXAM_GEN_ALL_CORE
T(C): 37 (08-06-18 @ 21:32)  T(F): 98.6 (08-06-18 @ 21:32), Max: 98.6 (08-06-18 @ 21:32)  HR: 88 (08-06-18 @ 21:32) (86 - 97)  BP: 114/76 (08-06-18 @ 21:32) (101/64 - 116/80)  RR:  (18 - 19)  SpO2:  (99% - 100%)    PHYSICAL EXAM:  GENERAL: NAD, well-groomed, well-developed  HEAD:  Atraumatic, Normocephalic  EYES: EOMI, PERRLA, conjunctiva and sclera clear and pale  ENMT: No tonsillar erythema, exudates, or enlargement; Moist mucous membranes,  NECK: Supple, No JVD, Normal thyroid  CHEST/LUNG: Clear to ausculation bilaterally; No rales, rhonchi, wheezing, or rubs  HEART: Regular rate and rhythm; No murmurs, rubs, or gallops  ABDOMEN: Soft, Nontender, Nondistended; BS+  EXTREMITIES:  2+ Peripheral Pulses, No clubbing, cyanosis, or edema  LYMPH: No lymphadenopathy noted  SKIN: No rashes or lesions  NERVOUS SYSTEM:  Alert & Oriented X3; Motor Strength 5/5 B/L upper and lower extremities; T(C): 37 (08-06-18 @ 21:32)  T(F): 98.6 (08-06-18 @ 21:32), Max: 98.6 (08-06-18 @ 21:32)  HR: 88 (08-06-18 @ 21:32) (86 - 97)  BP: 114/76 (08-06-18 @ 21:32) (101/64 - 116/80)  RR:  (18 - 19)  SpO2:  (99% - 100%)    PHYSICAL EXAM:  GENERAL: NAD, well-groomed, well-developed  HEAD:  Atraumatic, Normocephalic  EYES: EOMI, PERRLA, conjunctiva and sclera clear and pale  ENMT: No tonsillar erythema, exudates, or enlargement; Moist mucous membranes,  NECK: Supple, No JVD, Normal thyroid  CHEST/LUNG: Clear to ausculation bilaterally; No rales, rhonchi, wheezing, or rubs  HEART: Regular rate and rhythm; No murmurs, rubs, or gallops, tachycardic   ABDOMEN: Soft, Nontender, Nondistended; BS+  EXTREMITIES:  2+ Peripheral Pulses, No clubbing, cyanosis, or edema  LYMPH: No lymphadenopathy noted  SKIN: No rashes or lesions  NERVOUS SYSTEM:  Alert & Oriented X3; Motor Strength 5/5 B/L upper and lower extremities; T(C): 37 (08-06-18 @ 21:32)  T(F): 98.6 (08-06-18 @ 21:32), Max: 98.6 (08-06-18 @ 21:32)  HR: 88 (08-06-18 @ 21:32) (86 - 97)  BP: 114/76 (08-06-18 @ 21:32) (101/64 - 116/80)  RR:  (18 - 19)  SpO2:  (99% - 100%)    PHYSICAL EXAM:  GENERAL: NAD, well-groomed, well-developed  HEAD:  Atraumatic, Normocephalic  EYES: EOMI, conjunctiva and sclera clear and pale  ENMT: No tonsillar erythema, exudates, moist mucous membranes,  NECK: Supple, No JVD  CHEST/LUNG: Clear to ausculation bilaterally; No rales, rhonchi, wheezing  HEART: Regular rate and rhythm; No murmurs, rubs, or gallops   ABDOMEN: Soft, Nontender, Nondistended; BS+  EXTREMITIES: No clubbing, cyanosis, or edema  LYMPH: No lymphadenopathy noted  SKIN: No rashes or lesions  NERVOUS SYSTEM:  Alert & Oriented X3; non focal exam

## 2018-08-07 NOTE — DISCHARGE NOTE ADULT - MEDICATION SUMMARY - MEDICATIONS TO STOP TAKING
I will STOP taking the medications listed below when I get home from the hospital:    Xanax XR 1 mg oral tablet, extended release  -- 1 tab(s) by mouth 2 times a day

## 2018-08-07 NOTE — H&P ADULT - PROBLEM SELECTOR PLAN 1
-Pt with hx of Anemia and GIB coming in with Hgb of 7  -s/p 1U pRBC  -f/u post transfusion CBC and then q6hr after that  -IVF 150cc/hr acute on chronic anemia, with Hb 7.0 with outpt records showing baseline Hb ~10 (10.7 6/29/18), with dark stools, one episode of possible coffee ground emesis 2 weeks ago, concerning for UGIB  - pt also with hx of esophageal varices but denies any hematemesis or BRBPR  - s/p 1u PRBC, repeat CBC with adequate response   - monitor CBC q8h for now   - transfuse for HB <7 or if symptomatic   - f/u GI recs acute on chronic anemia, with Hb 7.0 with outpt records showing baseline Hb ~10 (10.7 6/29/18), with dark stools, one episode of possible coffee ground emesis 2 weeks ago, concerning for UGIB  - pt also with hx of esophageal varices but denies any hematemesis or BRBPR  - s/p 1u PRBC, repeat CBC with adequate response   - monitor CBC q8h for now   - transfuse for HB <7 or if symptomatic   - f/u GI recs  - active T&S

## 2018-08-07 NOTE — DISCHARGE NOTE ADULT - HOSPITAL COURSE
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 Pt states she normally has low hemoglobin levels, but today her physician, Dr. Xochilt Andrade called her and informed her that her Hgb was 7.0 and should be seen in ED. Per pt, she recalls one episode of possible coffee ground emesis in mid-late July while she was [facility]; was seen in ED, did not have any further episodes and was told to follow up with GI. She had a scheduled EGD with Dr Perry this week, for which she was getting blood work done. Pt endorsing dark brown stools, not tarry in nature for past 3d; denies any BRBPR, hematemesis, hemoptysis, and denies any change in bleeding during her menses. Pt endorses increasing fatigue over the past weak and mild MARRERO, but denies palpitations, SOB at rest, no CP, no N/V, no HA, no lightheadedness/dizziness, no dysuria, and no rashes. 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 Pt states she normally has low hemoglobin levels, but today her physician, Dr. Xochilt Andrade called her and informed her that her Hgb was 7.0 and should be seen in ED. Per pt, she recalls one episode of possible coffee ground emesis in mid-late July while she was [facility]; was seen in ED, did not have any further episodes and was told to follow up with GI. She had a scheduled EGD with Dr Perry this week, for which she was getting blood work done. Pt endorsing dark brown stools, not tarry in nature for past 3d; denies any BRBPR, hematemesis, hemoptysis, and denies any change in bleeding during her menses. Pt endorses increasing fatigue over the past weak and mild MARRERO, but denies palpitations, SOB at rest, no CP, no N/V, no HA, no lightheadedness/dizziness, no dysuria, and no rashes.  EGD done today and shows portal hypertensive gastropathy--likely the source of prior bleeding. Small esophageal varices without stigmata of bleeding. Scarred appearnce of distal esophagus, consistent with h/o prior bleeding. Small gastric varices in the cardia without stigmata of bleeding.  After procedure pt is tolerating diet and hemodynamically stable to be discharged home today. Spoke to Attending. Spoke to parents at bedside and questions answered. 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 Pt states she normally has low hemoglobin levels, but today her physician, Dr. Xochilt Andrade called her and informed her that her Hgb was 7.0 and should be seen in ED. Per pt, she recalls one episode of possible coffee ground emesis in mid-late July while she was [facility]; was seen in ED, did not have any further episodes and was told to follow up with GI. She had a scheduled EGD with Dr Perry this week, for which she was getting blood work done. Pt endorsing dark brown stools, not tarry in nature for past 3d; denies any BRBPR, hematemesis, hemoptysis, and denies any change in bleeding during her menses. Pt endorses increasing fatigue over the past weak and mild MARRERO, but denies palpitations, SOB at rest, no CP, no N/V, no HA, no lightheadedness/dizziness, no dysuria, and no rashes.  EGD done today and shows portal hypertensive gastropathy--likely the source of prior bleeding. Small esophageal varices without stigmata of bleeding. Scarred appearnce of distal esophagus, consistent with h/o prior bleeding. Small gastric varices in the cardia without stigmata of bleeding.  After procedure pt is tolerating diet and hemodynamically stable to be discharged home today. Spoke to Attending. Spoke to parents at bedside and questions answered. She will follow up with PCP and GI doctors. CBC needs to be drawn every 12 hours. 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 Pt states she normally has low hemoglobin levels, but today her physician, Dr. Xochilt Andrade called her and informed her that her Hgb was 7.0 and should be seen in ED. Per pt, she recalls one episode of possible coffee ground emesis in mid-late July while she was [facility]; was seen in ED, did not have any further episodes and was told to follow up with GI. She had a scheduled EGD with Dr Perry this week, for which she was getting blood work done. Pt endorsing dark brown stools, not tarry in nature for past 3d; denies any BRBPR, hematemesis, hemoptysis, and denies any change in bleeding during her menses. Pt endorses increasing fatigue over the past weak and mild MARRERO, but denies palpitations, SOB at rest, no CP, no N/V, no HA, no lightheadedness/dizziness, no dysuria, and no rashes.  For her chronic blood loss anemia and GI bleed, EGD done today and shows portal hypertensive gastropathy--likely the source of prior bleeding. Small esophageal varices without stigmata of bleeding. Scarred appearnce of distal esophagus, consistent with h/o prior bleeding. Small gastric varices in the cardia without stigmata of bleeding.  After procedure pt is tolerating diet and hemodynamically stable to be discharged home today. Cleared by hepatology for discharge.  Spoke to Attending. Spoke to parents at bedside and questions answered. She will follow up with PCP and GI doctors.

## 2018-08-07 NOTE — CONSULT NOTE ADULT - ATTENDING COMMENTS
Patient was seen and examined with GI fellow at rounds. Agree with above.  A 23 woman with PMH portal vein thrombosis c/b portal HTN with esophageal varices s/p banding in very early childhood, s/p MW tears in 2013 and 2018 s/p EGD  was seen for worsening anemia.   Hb was 10 about 2 months ago. She developed hematemesis 2 weeks ago, seen at ER in [facility]. Hb dropped to 7. No blood transfusion or diagnostic or therapeutic EGD was performed then . She had melena.  She is hemodynamically stable now.  Would recommend  -serial CBC, and blood transfusion if needed to maintain Hb 7-7.5  -Urgent EGD  -PPI IV, octreotide, and antibiotics if Esophageal variceal bleeding is highly suspected.

## 2018-08-07 NOTE — DISCHARGE NOTE ADULT - PATIENT PORTAL LINK FT
You can access the [facility] Patient Portal, offered by [facility], by registering with the following website: [email or web address]

## 2018-08-07 NOTE — H&P ADULT - NSHPREVIEWOFSYSTEMS_GEN_ALL_CORE
REVIEW OF SYSTEMS:    CONSTITUTIONAL: Has weakness with no fevers or chills  EYES/ENT: No visual changes;  No vertigo or throat pain   NECK: No pain or stiffness  RESPIRATORY: No cough, wheezing, hemoptysis; No shortness of breath  CARDIOVASCULAR: No chest pain or palpitations  GASTROINTESTINAL: No abdominal or epigastric pain. No nausea, vomiting, or hematemesis; No diarrhea or constipation. reports melena with no hematochezia.  GENITOURINARY: No dysuria, frequency or hematuria  NEUROLOGICAL: No numbness, No HA  SKIN: No itching, rashes  MSk: no joint pain, no muscle pain REVIEW OF SYSTEMS:    CONSTITUTIONAL: +generalized fatigue with no fevers or chills  EYES/ENT: No visual changes;  No vertigo or throat pain   NECK: No pain or stiffness  RESPIRATORY: No cough, wheezing, no hemoptysis; mild MARRERO  CARDIOVASCULAR: No chest pain or palpitations  GASTROINTESTINAL: No abdominal, no nausea, vomiting, or hematemesis; no diarrhea or constipation. +dark brown stool, no hematochezia.  GENITOURINARY: No dysuria, frequency or hematuria  GYN: no abnormal vaginal bleed  NEUROLOGICAL: No numbness, No HA, no lightheadedness   SKIN: No itching, rashes  MSK: no joint pain, no muscle pain

## 2018-08-07 NOTE — DISCHARGE NOTE ADULT - CARE PROVIDER_API CALL
Valentin Andrade), Gastroenterology; Internal Medicine  [address] [place] [address]  Phone: [phone]  Fax: [phone] Valentin Andrade), Gastroenterology; Internal Medicine  [address] [place] [id number]  Phone: [phone]  Fax: [phone]    Diaz Zelaya), Pediatrics  [address] [place] [id number]  Phone: [phone]  Fax: [phone]

## 2018-08-07 NOTE — H&P ADULT - HISTORY OF PRESENT ILLNESS
23 year old woman with PMH of Dionne Laura Tear, PCOS, Anxiety, Normocytic anemia, and migraine presenting with anemia. Pt states she normally has low hemoglobin levels, but today her physician, Dr. Lemon called her and informed her that her Hgb was 7.0. Pt states she has not noticed any blood loss as her menses are normal, has not vomited blood, but she did state her stools have been "dark." She endorses increasing fatigue over the past weak, with no palpitations, no SOB, no CP, no N/V, no HA, no AP pain, no dysuria, and no rashes.    Of note: Pt had ECG 03/2018 showing Dionne-Laura tear with mild portal hypertensive gastropathy and gastric erosions. 23 year old woman with PMH of Dionne Laura Tear, branchio-elizabeth-renal syndrome, esophageal varices s/p banding (1996), Portal vein thrombosis, Anxiety, Normocytic anemia, and migraine presenting with anemia. Pt states she normally has low hemoglobin levels, but today her physician, Dr. Lemon called her and informed her that her Hgb was 7.0. Pt states she has not noticed any blood loss as her menses are normal, has not vomited blood, but she did state her stools have been "dark." She endorses increasing fatigue over the past weak, with no palpitations, no SOB, no CP, no N/V, no HA, no AP pain, no dysuria, and no rashes.    Of note: Pt had ECG 03/2018 showing Dionne-Laura tear with mild portal hypertensive gastropathy and gastric erosions. 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 Pt states she normally has low hemoglobin levels, but today her physician, Dr. Xochilt Andrade called her and informed her that her Hgb was 7.0 and should be seen in ED. Per pt, she recalls one episode of possible coffee ground emesis in mid-late July while she was [facility]; was seen in ED, did not have any further episodes and was told to follow up with GI. She had a scheduled EGD with Dr Perry this week, for which she was getting blood work done. Pt endorsing dark brown stools, not tarry in nature for past 3d; denies any BRBPR, hematemesis, hemoptysis, and denies any change in bleeding during her menses. Pt endorses increasing fatigue over the past weak and mild MARRERO, but denies palpitations, SOB at rest, no CP, no N/V, no HA, no lightheadedness/dizziness, no dysuria, and no rashes.    Last EGD was in 4/2018 when pt was admitted to [facility] for hematemesis - admitted to ICU for high risk EGD, which showed Dionne Laura tear as likely source of bleeding, scarring in esophagus from prior variceal banding and mild portal hypertensive gastropathy and gastric erosion and possible non bleeding varices.

## 2018-08-07 NOTE — H&P ADULT - ATTENDING COMMENTS
Patient assigned to me by night hospitalist in charge for management and care for patient for this evening only. Care to be resumed by day hospitalist in the morning and thereafter.     Pt seen and examined. Case d/w house staff Dr. Allen. Agree with assessment and plan, with changes made where appropriate.

## 2018-08-07 NOTE — PROGRESS NOTE ADULT - SUBJECTIVE AND OBJECTIVE BOX
Patient is a 23y old  Female who presents with a chief complaint of Anemia (07 Aug 2018 01:43)      SUBJECTIVE / OVERNIGHT EVENTS:  no acute events overnight  patient sleeping and asking her mom to answer questions for her.  Per her mom, no evidence of acute bleeding recently.  No diarrhea/constipation/vomiting.  Had 1 dark stool after iron recently.  Had been routinely checking hb at home and has been 7 for past 2 weeks.  Wants her to go home asap since she'll be more comfortable there.    MEDICATIONS  (STANDING):  escitalopram 20 milliGRAM(s) Oral daily  pantoprazole  Injectable 40 milliGRAM(s) IV Push two times a day  sodium chloride 0.9%. 1000 milliLiter(s) (100 mL/Hr) IV Continuous <Continuous>    MEDICATIONS  (PRN):  acetaminophen   Tablet 650 milliGRAM(s) Oral every 6 hours PRN For Temp greater than 38 C (100.4 F)  acetaminophen   Tablet. 650 milliGRAM(s) Oral every 6 hours PRN Mild Pain (1 - 3)  ALPRAZolam 1 milliGRAM(s) Oral two times a day PRN Anxiety      Vital Signs Last 24 Hrs  T(C): 36.7 (07 Aug 2018 10:49), Max: 37 (06 Aug 2018 21:32)  T(F): 98 (07 Aug 2018 10:49), Max: 98.6 (06 Aug 2018 21:32)  HR: 78 (07 Aug 2018 10:49) (69 - 97)  BP: 110/65 (07 Aug 2018 10:49) (101/64 - 116/80)  BP(mean): --  RR: 16 (07 Aug 2018 10:49) (16 - 19)  SpO2: 99% (07 Aug 2018 10:49) (99% - 100%)  CAPILLARY BLOOD GLUCOSE        I&O's Summary      PHYSICAL EXAM:  GENERAL: NAD, well-developed  HEAD:  Atraumatic, Normocephalic  EYES: EOMI, PERRLA, conjunctiva and sclera clear  NECK: Supple, No JVD  CHEST/LUNG: Clear to auscultation bilaterally; No wheeze  HEART: Regular rate and rhythm; No murmurs, rubs, or gallops  ABDOMEN: Soft, Nontender, Nondistended; Bowel sounds present  EXTREMITIES:  2+ Peripheral Pulses, No clubbing, cyanosis, or edema  PSYCH: AAOx3  NEUROLOGY: non-focal  SKIN: No rashes or lesions    LABS:                        8.5    3.5   )-----------( 101      ( 07 Aug 2018 11:11 )             26.0     08-07    141  |  106  |  8   ----------------------------<  90  3.6   |  24  |  0.73    Ca    8.1<L>      07 Aug 2018 08:15    TPro  6.8  /  Alb  4.5  /  TBili  0.6  /  DBili  x   /  AST  23  /  ALT  19  /  AlkPhos  74  08-06    PT/INR - ( 07 Aug 2018 08:15 )   PT: 13.3 sec;   INR: 1.23 ratio         PTT - ( 07 Aug 2018 08:15 )  PTT:29.2 sec          RADIOLOGY & ADDITIONAL TESTS:    Imaging Personally Reviewed:    Consultant(s) Notes Reviewed:      Care Discussed with Consultants/Other Providers: GI team

## 2018-08-07 NOTE — H&P ADULT - PROBLEM SELECTOR PROBLEM 4
Prophylactic measure Esophageal varices without bleeding, unspecified esophageal varices type Thrombocytopenia

## 2018-08-07 NOTE — PROGRESS NOTE ADULT - ATTENDING COMMENTS
Beeper: [phone]    discharge time: 40min.  Discharge pending EGD results. If no acute findings on EGD and cleared by GI, may d/c home in afternoon post procedure if hb stable.  Family prefers d/c asap if possible.

## 2018-08-07 NOTE — DISCHARGE NOTE ADULT - PLAN OF CARE
stable cont current home meds resolved cont current home meds and follow up with your PCP, GI doctors in one week cont current home meds and pantoprazole as prescribed follow up with GI doctor follow up with your PCP cont current home meds and follow up with your PCP, GI doctors in one week, you need to check cbc every 12 hours--go to your GI or PCP's office to have blood drawn.

## 2018-08-07 NOTE — PROGRESS NOTE ADULT - SUBJECTIVE AND OBJECTIVE BOX
Pre-Endoscopy Evaluation      Referring Physician:   Dr. 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      Sedation by Anesthesia [x]    PAST MEDICAL & SURGICAL HISTORY:  Iron deficiency anemia  ADHD (attention deficit hyperactivity disorder)  Polycystic ovarian syndrome: diagnosed 7/2011  Duplicate cervix  Vaginal septum: 1994  Branchio-elizabeth-renal syndrome  Esophageal varices: s/p banding 1996  Portal vein thrombosis: 1996  Thrombocytopenia  Depression  Anxiety  ASD (atrial septal defect)  Hypersplenism  Portal hypertension  Cataract: both eyes - cataract removal with lens implantation  Hernia: bilateral hernia repair  Megaureter: left (1994)  Varices, esophageal: s/p banding  ASD (atrial septal defect): s/p repair      PMH of Gastroparesis [ ]  Gastric Surgery [ ]  Gastric Outlet Obstruction [ ]    Allergies    No Known Allergies    Intolerances      Latex allergy: [ ] yes [X] no    Medications:MEDICATIONS  (STANDING):  escitalopram 20 milliGRAM(s) Oral daily  pantoprazole  Injectable 40 milliGRAM(s) IV Push two times a day  sodium chloride 0.9%. 1000 milliLiter(s) (100 mL/Hr) IV Continuous <Continuous>    MEDICATIONS  (PRN):  acetaminophen   Tablet 650 milliGRAM(s) Oral every 6 hours PRN For Temp greater than 38 C (100.4 F)  acetaminophen   Tablet. 650 milliGRAM(s) Oral every 6 hours PRN Mild Pain (1 - 3)  ALPRAZolam 1 milliGRAM(s) Oral two times a day PRN Anxiety      Smoking: [ ] yes  [X] no    AICD/PPM: [ ] yes   [X] no    Pertinent lab data:                        8.5    3.5   )-----------( 101      ( 07 Aug 2018 11:11 )             26.0     08-07    141  |  106  |  8   ----------------------------<  90  3.6   |  24  |  0.73    Ca    8.1<L>      07 Aug 2018 08:15    TPro  6.8  /  Alb  4.5  /  TBili  0.6  /  DBili  x   /  AST  23  /  ALT  19  /  AlkPhos  74  08-06    PT/INR - ( 07 Aug 2018 08:15 )   PT: 13.3 sec;   INR: 1.23 ratio         PTT - ( 07 Aug 2018 08:15 )  PTT:29.2 sec        Physical Examination:    Daily   Vital Signs Last 24 Hrs  T(C): 36.7 (07 Aug 2018 10:49), Max: 37 (06 Aug 2018 21:32)  T(F): 98 (07 Aug 2018 10:49), Max: 98.6 (06 Aug 2018 21:32)  HR: 78 (07 Aug 2018 10:49) (69 - 88)  BP: 110/65 (07 Aug 2018 10:49) (101/64 - 116/80)  BP(mean): --  RR: 16 (07 Aug 2018 10:49) (16 - 19)  SpO2: 99% (07 Aug 2018 10:49) (99% - 100%)    Drug Dosing Weight  Height (cm): 152.4 (06 Aug 2018 13:49)  Weight (kg): 61.2 (06 Aug 2018 13:49)  BMI (kg/m2): 26.4 (06 Aug 2018 13:49)  BSA (m2): 1.58 (06 Aug 2018 13:49)    Constitutional: NAD  Neck:  No JVD  Respiratory: CTAB/L  Cardiovascular: S1 and S2  Gastrointestinal: BS+, soft, NT/ND  Extremities: No peripheral edema  Neurological: A/O x 3, no focal deficits  : No Johns  Skin: No rashes    Comments:    ASA Class: I [ ]  II [X]  III [ ]  IV [ ]    The patient is a suitable candidate for the planned procedure unless box checked [ ]  No, explain:

## 2018-08-07 NOTE — CONSULT NOTE ADULT - ASSESSMENT
Impression:  1) Normocytic anemia- Differential diagnosis includes portal hypertensive gastropathy given history of that in the past, esophageal varices, PUD, erosive gastropathy/esophagitis  2) Congenital liver disease with portal vein thrombosis c/b portal HTN     Recommendation:  -Will schedule for EGD today  -Serial H/H, transfuse to hgb of 7  -No contraindication to discharge if EGD is unchanged from prior    Reinier Spears, PGY4  Gastroenterology and Hepatology Fellow  Pager # [phone] / [phone]

## 2018-08-07 NOTE — ED ADULT NURSE REASSESSMENT NOTE - NS ED NURSE REASSESS COMMENT FT1
awaiting bed. VSS. NAD noted. 150ml/hr of NS being administered via 20G RAC. pt denies pain currently.

## 2018-08-07 NOTE — H&P ADULT - PROBLEM SELECTOR PLAN 6
-c/w with Lexapro and Xanax  -Confirmed Xanax with pharmacist via Istop 1mg BID DVT: IMPROVE 0 and in the setting of possible bleed will hold AC  Diet: NPO for planned EGD

## 2018-08-07 NOTE — H&P ADULT - PROBLEM SELECTOR PLAN 4
DVT: IMPROVE0 and in the setting of possible bleed will hold AC  Diet: NPO for planned EGD DVT: IMPROVE 0 and in the setting of possible bleed will hold AC  Diet: NPO for planned EGD pt with hx of esophageal varices platelets of 103, at baseline  2/2 splenomegaly and sequestration   monitor CBC counts

## 2018-08-07 NOTE — H&P ADULT - NSHPSOCIALHISTORY_GEN_ALL_CORE
Social History:    Marital Status:  (   )    ( X  ) Single    (   )    (  )   Lives with: (  ) alone  (  ) children   (  ) spouse   ( X ) parents  (  ) other    Substance Use (street drugs): ( X ) never used  (  ) other:  Tobacco Usage:  (   X) never smoked   (   ) former smoker   (   ) current smoker  (     ) pack year  (        ) last cigarette date  Alcohol Usage: Denies  Sexual History: Sexually active

## 2018-08-07 NOTE — PROGRESS NOTE ADULT - ASSESSMENT
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

## 2018-08-07 NOTE — H&P ADULT - ASSESSMENT
23 year old woman with PMH of Dionne Laura Tear, PCOS, Anxiety, Normocytic anemia, and migraine presenting with anemia concerning for UGIB as patient reports dark stools. 23 year old woman with PMH of Dionne Laura Tear, branchio-elizabeth-renal syndrome, esophageal varices s/p banding (1996), Portal vein thrombosis, Anxiety, Normocytic anemia, and migraine presenting with anemia concerning for UGIB as patient reports dark stools. 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

## 2018-08-07 NOTE — ED ADULT NURSE REASSESSMENT NOTE - NS ED NURSE REASSESS COMMENT FT1
0700 report received from night nurse Angeles Carr RN. TBA. No bed yet. A&Ox4. No c/o pain. IV intact LACF without sx of infilt. IV fluid infusing well via pump. No rectal bleeding visible at present. NPO for endoscopy today. 0830 Ambulated to bathroom to void. No c/o dizziness.

## 2018-08-07 NOTE — PROGRESS NOTE ADULT - PROBLEM SELECTOR PLAN 3
pt with hx of esophageal varices 2/2 portal HTN 2/2 congenital liver disease with PVT; s/p banding at young age, no recent variceal bleeds  do not suspect current bleed 2/2 varices, hold off on octreotide for now   monitor CBC  f/u GI recs

## 2018-08-07 NOTE — PROGRESS NOTE ADULT - PROBLEM SELECTOR PLAN 1
acute on chronic anemia, with Hb 7.0 with outpt records showing baseline Hb ~10 (10.7 6/29/18), with dark stools, one episode of possible coffee ground emesis 2 weeks ago, concerning for UGIB  - pt also with remote hx of esophageal varices but denies any hematemesis or BRBPR  - s/p 1u PRBC, repeat CBC with adequate response   - monitor CBC q12 for now or with episode of blood loss  - transfuse for HB <7 or if symptomatic   - f/u GI recs.  Per GI, Dr Spears from hepatology already aware of patient and likely for EGD today.  - active T&S

## 2018-08-07 NOTE — H&P ADULT - NSHPLABSRESULTS_GEN_ALL_CORE
08-06    136  |  99  |  10  ----------------------------<  93  3.6   |  25  |  0.76    Ca    8.8      06 Aug 2018 15:16    TPro  6.8  /  Alb  4.5  /  TBili  0.6  /  DBili  x   /  AST  23  /  ALT  19  /  AlkPhos  74  08-06    PT/INR - ( 06 Aug 2018 15:16 )   PT: 12.9 sec;   INR: 1.18 ratio         PTT - ( 06 Aug 2018 15:16 )  PTT:28.4 sec                                    7.4    5.0   )-----------( 105      ( 06 Aug 2018 15:16 )             22.8 Labs personally reviewed and interpreted by me - Hb 7.4 (outpt Hb ~7.0 for past few days, decrease from 10.7 in 6/29/18), baseline Cr  Imaging personally reviewed and interpreted by me - prior US with atrophic L kidney   EKG personally reviewed and interpreted by me - prior EKG showing NSR, no CELESTINE/TWI; EKG pending      08-06               7.4    5.0   )-----------( 105      ( 06 Aug 2018 15:16 )             22.8    136  |  99  |  10  ----------------------------<  93  3.6   |  25  |  0.76    Ca    8.8      06 Aug 2018 15:16    TPro  6.8  /  Alb  4.5  /  TBili  0.6  /  DBili  x   /  AST  23  /  ALT  19  /  AlkPhos  74  08-06    PT/INR - ( 06 Aug 2018 15:16 )   PT: 12.9 sec;   INR: 1.18 ratio         PTT - ( 06 Aug 2018 15:16 )  PTT:28.4 sec    < from: US Abdomen Complete (04.02.18 @ 15:02) >    Liver: Normal in size. No focal lesion.   Bile ducts: Normal caliber. Common bile duct measures 6 mm.   Gallbladder: Within normal limits.      Pancreas: Very limited.  Spleen: 14.3 cm. splenomegaly. Prominent splenule measuring 2.5 cm x 2.6   x 2.7 cm noted.  Right kidney: 9.5 cm. No hydronephrosis.      Left kidney: 6 cm. atrophic.  Ascites: None.  Aorta and IVC: Both are limited. Visualized portions are within normal   limits.    IMPRESSION:   Splenomegaly.  Atrophic left kidney. Labs personally reviewed and interpreted by me - Hb 7.4 (outpt Hb ~7.0 for past few days, decrease from 10.7 in 6/29/18), baseline Cr  Imaging personally reviewed and interpreted by me - prior US with atrophic L kidney   EKG personally reviewed and interpreted by me - NSR at 70BPM, no CELESTINE/TWI,     08-06               7.4    5.0   )-----------( 105      ( 06 Aug 2018 15:16 )             22.8    136  |  99  |  10  ----------------------------<  93  3.6   |  25  |  0.76    Ca    8.8      06 Aug 2018 15:16    TPro  6.8  /  Alb  4.5  /  TBili  0.6  /  DBili  x   /  AST  23  /  ALT  19  /  AlkPhos  74  08-06    PT/INR - ( 06 Aug 2018 15:16 )   PT: 12.9 sec;   INR: 1.18 ratio         PTT - ( 06 Aug 2018 15:16 )  PTT:28.4 sec    < from: US Abdomen Complete (04.02.18 @ 15:02) >    Liver: Normal in size. No focal lesion.   Bile ducts: Normal caliber. Common bile duct measures 6 mm.   Gallbladder: Within normal limits.      Pancreas: Very limited.  Spleen: 14.3 cm. splenomegaly. Prominent splenule measuring 2.5 cm x 2.6   x 2.7 cm noted.  Right kidney: 9.5 cm. No hydronephrosis.      Left kidney: 6 cm. atrophic.  Ascites: None.  Aorta and IVC: Both are limited. Visualized portions are within normal   limits.    IMPRESSION:   Splenomegaly.  Atrophic left kidney.

## 2018-08-07 NOTE — DISCHARGE NOTE ADULT - CARE PLAN
Principal Discharge DX:	Acute blood loss anemia  Secondary Diagnosis:	Upper GI bleed  Secondary Diagnosis:	Varices, esophageal  Secondary Diagnosis:	Thrombocytopenia  Secondary Diagnosis:	Anxiety Principal Discharge DX:	Acute blood loss anemia  Goal:	resolved  Assessment and plan of treatment:	cont current home meds and follow up with your PCP, GI doctors in one week  Secondary Diagnosis:	Upper GI bleed  Goal:	resolved  Assessment and plan of treatment:	cont current home meds and pantoprazole as prescribed  Secondary Diagnosis:	Varices, esophageal  Goal:	stable  Assessment and plan of treatment:	follow up with GI doctor  Secondary Diagnosis:	Thrombocytopenia  Goal:	stable  Assessment and plan of treatment:	follow up with your PCP  Secondary Diagnosis:	Anxiety  Goal:	stable  Assessment and plan of treatment:	cont current home meds Principal Discharge DX:	Acute blood loss anemia  Goal:	resolved  Assessment and plan of treatment:	cont current home meds and follow up with your PCP, GI doctors in one week, you need to check cbc every 12 hours--go to your GI or PCP's office to have blood drawn.  Secondary Diagnosis:	Upper GI bleed  Goal:	resolved  Assessment and plan of treatment:	cont current home meds and pantoprazole as prescribed  Secondary Diagnosis:	Varices, esophageal  Goal:	stable  Assessment and plan of treatment:	follow up with GI doctor  Secondary Diagnosis:	Thrombocytopenia  Goal:	stable  Assessment and plan of treatment:	follow up with your PCP  Secondary Diagnosis:	Anxiety  Goal:	stable  Assessment and plan of treatment:	cont current home meds

## 2018-08-07 NOTE — DISCHARGE NOTE ADULT - CARE PROVIDERS DIRECT ADDRESSES
[email or web address]

## 2018-08-07 NOTE — H&P ADULT - PROBLEM SELECTOR PLAN 2
Jose Law(Resident) -Pt with hx of anemia and Dionne Laura tear on EGD 03/2018, but less concerning as pt does not report recent vomiting episodes, however there were erosions seen at that time  -s/p 1Unit pRBC  -Will administer IVF 150cc/hr  -Protonix 40mg IV  -Will f/u post pRBC CBC, CBC q6hr  -GI consult for ECG tomorrow AM  -NPO  -At least 2x IV acess, 18 gauge or larger -Pt with hx of anemia and Dionne Laura tear on EGD 03/2018, but less concerning as pt does not report recent vomiting episodes, however there were erosions seen at that time. Possibility of esophogeal varices however pt does not report hematemesis in the last 7 days.  -s/p 1Unit pRBC  -Will administer IVF 150cc/hr  -Protonix 80mg IV, followed by 40mg IV BID  -Will f/u post pRBC CBC, CBC q6hr  -GI consult for ECG tomorrow AM  -NPO  -At least 2x IV access, 18 gauge or larger -Pt with hx of Anemia and GIB coming in with Hgb of 7  -s/p 1U pRBC  -f/u post transfusion CBC and then q6hr after that  -IVF 150cc/hr Pt with worsening anemia with hx of dark stools, an episode of coffee ground emesis 2 weeks ago; suspect 2/2 PUD vs gastropathy in setting of portal HTN, less likely esophageal variceal bleed or Dionne carroll tear given no hematemesis   -s/p 1Unit pRBC  -Protonix 80mg IV, followed by 40mg IV BID; hold off on octreotide for now   - GI following - plan for EGD today, keep NPO; maintenance IVF   - monitor CBC q8h  - At least 2x IV access, 18 gauge or larger

## 2018-08-07 NOTE — CONSULT NOTE ADULT - SUBJECTIVE AND OBJECTIVE BOX
Chief Complaint:  Patient is a 23y old  Female who presents with a chief complaint of Anemia (07 Aug 2018 15:01)      HPI: 23F with PMH of branchio-elizabeth-renal syndrome with L atrophic kidney, congenital liver disease with portal vein thrombosis c/b portal HTN with esophageal varices s/p banding (mostly between age 2-6yo), Dionne Laura tears (2013, 2018), presents with worsening anemia. Pt states that her Hgb was 10 on her labs a few months ago, and 2 weeks ago her Hgb was 7.0. She had an episode of hematemesis 2 weeks ago requiring her to go to the ED, at that time her Hgb was 7.0, she was given transfusion and was discharged. Pt currently endorses dark brown stool, denies BRBPR, hematemesis, hemoptysis. Has an EGD scheduled with Dr Perry on Friday.     Pt's last EGD was in 4/2018 when pt was admitted to [facility] for hematemesis - admitted to ICU for high risk EGD, which showed Dionne Laura tear as likely source of bleeding, scarring in esophagus from prior variceal banding and mild portal hypertensive gastropathy and gastric erosion and possible non bleeding varices.     Allergies:  No Known Allergies      Home Medications:    Hospital Medications:  acetaminophen   Tablet 650 milliGRAM(s) Oral every 6 hours PRN  acetaminophen   Tablet. 650 milliGRAM(s) Oral every 6 hours PRN  ALPRAZolam 1 milliGRAM(s) Oral two times a day PRN  escitalopram 20 milliGRAM(s) Oral daily  pantoprazole  Injectable 40 milliGRAM(s) IV Push two times a day  sodium chloride 0.9%. 1000 milliLiter(s) IV Continuous <Continuous>      PMHX/PSHX:  Iron deficiency anemia  ADHD (attention deficit hyperactivity disorder)  Polycystic ovarian syndrome  Duplicate cervix  Vaginal septum  Branchio-elizabeth-renal syndrome  Esophageal varices  Portal vein thrombosis  Thrombocytopenia  Depression  Anxiety  ASD (atrial septal defect)  Hypersplenism  Portal hypertension  Cataract  Hernia  Megaureter  Varices, esophageal  ASD (atrial septal defect)      Family history:  No pertinent family history in first degree relatives      Social History: Denies alcohol, drug use.     ROS:     General:  No wt loss, fevers, chills, night sweats, fatigue,   Eyes:  Good vision, no reported pain  ENT:  No sore throat, pain, runny nose, dysphagia  CV:  No pain, palpitations, hypo/hypertension  Resp:  No dyspnea, cough, tachypnea, wheezing  GI:  See HPI  :  No pain, bleeding, incontinence, nocturia  Muscle:  No pain, weakness  Neuro:  No weakness, tingling, memory problems  Psych:  No fatigue, insomnia, mood problems, depression  Endocrine:  No polyuria, polydipsia, cold/heat intolerance  Heme:  No petechiae, ecchymosis, easy bruisability  Skin:  No rash, tattoos, scars, edema      PHYSICAL EXAM:     GENERAL:  Appears stated age, well-groomed, well-nourished, no distress  HEENT:  NC/AT,  conjunctivae clear and pink, no thyromegaly, nodules, adenopathy, no JVD, sclera -anicteric  CHEST:  Full & symmetric excursion, no increased effort, breath sounds clear  HEART:  Regular rhythm, S1, S2, no murmur/rub/S3/S4, no abdominal bruit, no edema  ABDOMEN:  Soft, non-tender, non-distended, normoactive bowel sounds,  no masses ,no hepato-splenomegaly, no signs of chronic liver disease  EXTEREMITIES:  no cyanosis,clubbing or edema  SKIN:  No rash/erythema/ecchymoses/petechiae/wounds/abscess/warm/dry  NEURO:  Alert, oriented, no asterixis, no tremor, no encephalopathy  RECTAL: no stool on rectal vault    Vital Signs:  Vital Signs Last 24 Hrs  T(C): 36.6 (07 Aug 2018 16:40), Max: 37 (06 Aug 2018 21:32)  T(F): 97.9 (07 Aug 2018 16:40), Max: 98.6 (06 Aug 2018 21:32)  HR: 80 (07 Aug 2018 16:40) (69 - 88)  BP: 108/73 (07 Aug 2018 16:40) (101/64 - 116/80)  BP(mean): --  RR: 17 (07 Aug 2018 16:40) (16 - 18)  SpO2: 100% (07 Aug 2018 16:40) (99% - 100%)  Daily     Daily     LABS:                        8.5    3.5   )-----------( 101      ( 07 Aug 2018 11:11 )             26.0     Mean Cell Volume: 83.3 fl (08-07-18 @ 11:11)    08-07    141  |  106  |  8   ----------------------------<  90  3.6   |  24  |  0.73    Ca    8.1<L>      07 Aug 2018 08:15    TPro  6.8  /  Alb  4.5  /  TBili  0.6  /  DBili  x   /  AST  23  /  ALT  19  /  AlkPhos  74  08-06    LIVER FUNCTIONS - ( 06 Aug 2018 15:16 )  Alb: 4.5 g/dL / Pro: 6.8 g/dL / ALK PHOS: 74 U/L / ALT: 19 U/L / AST: 23 U/L / GGT: x           PT/INR - ( 07 Aug 2018 08:15 )   PT: 13.3 sec;   INR: 1.23 ratio         PTT - ( 07 Aug 2018 08:15 )  PTT:29.2 sec                            8.5    3.5   )-----------( 101      ( 07 Aug 2018 11:11 )             26.0                         8.4    5.6   )-----------( 103      ( 07 Aug 2018 02:27 )             25.9                         7.4    5.0   )-----------( 105      ( 06 Aug 2018 15:16 )             22.8     Imaging: Chief Complaint:  Patient is a 23y old  Female who presents with a chief complaint of Anemia (07 Aug 2018 15:01)      HPI: 23F with PMH of branchio-elziabeth-renal syndrome with L atrophic kidney, congenital liver disease with portal vein thrombosis c/b portal HTN with esophageal varices s/p banding (mostly between age 2-6yo), Dionne Laura tears (2013, 2018), presents with worsening anemia. Pt states that her Hgb was 10 on her labs a few months ago, and 2 weeks ago her Hgb was 7.0. She had an episode of hematemesis 2 weeks ago requiring her to go to the ED, at that time her Hgb was 7.0, she did not receive transfusion and was discharged. Pt currently endorses dark brown stool, denies BRBPR, hematemesis, hemoptysis. Has an EGD scheduled with Dr Perry on Friday.     Pt's last EGD was in 4/2018 when pt was admitted to [facility] for hematemesis - admitted to ICU for high risk EGD, which showed Dionne Laura tear as likely source of bleeding, scarring in esophagus from prior variceal banding and mild portal hypertensive gastropathy and gastric erosion and possible non bleeding varices.     Allergies:  No Known Allergies      Home Medications:    Hospital Medications:  acetaminophen   Tablet 650 milliGRAM(s) Oral every 6 hours PRN  acetaminophen   Tablet. 650 milliGRAM(s) Oral every 6 hours PRN  ALPRAZolam 1 milliGRAM(s) Oral two times a day PRN  escitalopram 20 milliGRAM(s) Oral daily  pantoprazole  Injectable 40 milliGRAM(s) IV Push two times a day  sodium chloride 0.9%. 1000 milliLiter(s) IV Continuous <Continuous>      PMHX/PSHX:  Iron deficiency anemia  ADHD (attention deficit hyperactivity disorder)  Polycystic ovarian syndrome  Duplicate cervix  Vaginal septum  Branchio-elizabeth-renal syndrome  Esophageal varices  Portal vein thrombosis  Thrombocytopenia  Depression  Anxiety  ASD (atrial septal defect)  Hypersplenism  Portal hypertension  Cataract  Hernia  Megaureter  Varices, esophageal  ASD (atrial septal defect)      Family history:  No pertinent family history in first degree relatives      Social History: Denies alcohol, drug use.     ROS:     General:  No wt loss, fevers, chills, night sweats, fatigue,   Eyes:  Good vision, no reported pain  ENT:  No sore throat, pain, runny nose, dysphagia  CV:  No pain, palpitations, hypo/hypertension  Resp:  No dyspnea, cough, tachypnea, wheezing  GI:  See HPI  :  No pain, bleeding, incontinence, nocturia  Muscle:  No pain, weakness  Neuro:  No weakness, tingling, memory problems  Psych:  No fatigue, insomnia, mood problems, depression  Endocrine:  No polyuria, polydipsia, cold/heat intolerance  Heme:  No petechiae, ecchymosis, easy bruisability  Skin:  No rash, tattoos, scars, edema      PHYSICAL EXAM:     GENERAL:  Appears stated age, well-groomed, well-nourished, no distress  HEENT:  NC/AT,  conjunctivae clear and pink, no thyromegaly, nodules, adenopathy, no JVD, sclera -anicteric  CHEST:  Full & symmetric excursion, no increased effort, breath sounds clear  HEART:  Regular rhythm, S1, S2, no murmur/rub/S3/S4, no abdominal bruit, no edema  ABDOMEN:  Soft, non-tender, non-distended, normoactive bowel sounds,  no masses ,no hepato-splenomegaly, no signs of chronic liver disease  EXTEREMITIES:  no cyanosis,clubbing or edema  SKIN:  No rash/erythema/ecchymoses/petechiae/wounds/abscess/warm/dry  NEURO:  Alert, oriented, no asterixis, no tremor, no encephalopathy  RECTAL: no stool on rectal vault    Vital Signs:  Vital Signs Last 24 Hrs  T(C): 36.6 (07 Aug 2018 16:40), Max: 37 (06 Aug 2018 21:32)  T(F): 97.9 (07 Aug 2018 16:40), Max: 98.6 (06 Aug 2018 21:32)  HR: 80 (07 Aug 2018 16:40) (69 - 88)  BP: 108/73 (07 Aug 2018 16:40) (101/64 - 116/80)  BP(mean): --  RR: 17 (07 Aug 2018 16:40) (16 - 18)  SpO2: 100% (07 Aug 2018 16:40) (99% - 100%)  Daily     Daily     LABS:                        8.5    3.5   )-----------( 101      ( 07 Aug 2018 11:11 )             26.0     Mean Cell Volume: 83.3 fl (08-07-18 @ 11:11)    08-07    141  |  106  |  8   ----------------------------<  90  3.6   |  24  |  0.73    Ca    8.1<L>      07 Aug 2018 08:15    TPro  6.8  /  Alb  4.5  /  TBili  0.6  /  DBili  x   /  AST  23  /  ALT  19  /  AlkPhos  74  08-06    LIVER FUNCTIONS - ( 06 Aug 2018 15:16 )  Alb: 4.5 g/dL / Pro: 6.8 g/dL / ALK PHOS: 74 U/L / ALT: 19 U/L / AST: 23 U/L / GGT: x           PT/INR - ( 07 Aug 2018 08:15 )   PT: 13.3 sec;   INR: 1.23 ratio         PTT - ( 07 Aug 2018 08:15 )  PTT:29.2 sec                            8.5    3.5   )-----------( 101      ( 07 Aug 2018 11:11 )             26.0                         8.4    5.6   )-----------( 103      ( 07 Aug 2018 02:27 )             25.9                         7.4    5.0   )-----------( 105      ( 06 Aug 2018 15:16 )             22.8     Imaging:

## 2018-08-07 NOTE — PROGRESS NOTE ADULT - PROBLEM SELECTOR PLAN 2
Pt with worsening anemia with hx of dark stools, an episode of coffee ground emesis 2 weeks ago; suspect 2/2 PUD vs gastropathy in setting of portal HTN, less likely esophageal variceal bleed or Dionne carroll tear given no hematemesis   -s/p 1Unit pRBC  -Protonix 80mg IV, followed by 40mg IV BID; hold off on octreotide for now   - hepatology following - plan for EGD today, keep NPO; maintenance IVF   - monitor CBC q12  - At least 2x IV access, 18 gauge or larger

## 2018-08-07 NOTE — CHART NOTE - NSCHARTNOTEFT_GEN_A_CORE
Medicine Accept Note    HPI / Hospital Course:   23F with PMH of branchio-elizabeth-renal syndrome with L atrophic kidney, congenital liver disease with portal vein thrombosis c/b portal HTN with esophageal varices s/p banding (mostly between age 2-6yo), Dionne Laura tears (2013, 2018), depression/anxiety, normocytic anemia, and migraine presenting with worsening anemia. Pt states she normally has low hemoglobin levels, but on day of presentation, her physician, Dr. Xochilt Andrade called her to inform her that her Hgb was 7.0. She reports one episode of coffee-ground emesis in mid-late July for which she was seen in the ED, transfused 1 unit of blood and discharged with instructions to f/u with her GI. She was scheduled for an EGD with Dr. Perry this week. Pt endorsing dark brown stools, not tarry in nature for past 3d; denies any BRBPR, hematemesis, hemoptysis, and denies any change in bleeding during her menses. Pt endorses increasing fatigue over the past weak and mild MARRERO, but denies palpitations, dyspnea at rest, lightheadedness or syncope.     Of note, her most recent EGD was in 4/2018 during an admission for hematemesis. She was admitted to the ICU for high-risk EGD which revealed Dionne Laura tear as the likely source of bleeding as well as esophageal scarring from prior variceal banding and mild portal hypertensive gastropathy, gastric erosion and possible non-bleeding varices.     She was admitted to Medicine and underwent EGD on 8/7.

## 2018-08-10 ENCOUNTER — APPOINTMENT (OUTPATIENT)
Dept: HEPATOLOGY | Facility: HOSPITAL | Age: 23
End: 2018-08-10

## 2018-09-10 ENCOUNTER — APPOINTMENT (OUTPATIENT)
Dept: HEPATOLOGY | Facility: CLINIC | Age: 23
End: 2018-09-10
Payer: COMMERCIAL

## 2018-09-10 VITALS
DIASTOLIC BLOOD PRESSURE: 70 MMHG | TEMPERATURE: 98.4 F | HEIGHT: 59 IN | SYSTOLIC BLOOD PRESSURE: 100 MMHG | HEART RATE: 69 BPM | BODY MASS INDEX: 27.82 KG/M2 | WEIGHT: 138 LBS | RESPIRATION RATE: 13 BRPM

## 2018-09-10 DIAGNOSIS — D64.9 ANEMIA, UNSPECIFIED: ICD-10-CM

## 2018-09-10 DIAGNOSIS — K92.0 HEMATEMESIS: ICD-10-CM

## 2018-09-10 PROCEDURE — 99214 OFFICE O/P EST MOD 30 MIN: CPT

## 2018-09-11 PROBLEM — D64.9 ANEMIA: Status: ACTIVE | Noted: 2018-09-11

## 2018-09-19 ENCOUNTER — APPOINTMENT (OUTPATIENT)
Dept: HEPATOLOGY | Facility: CLINIC | Age: 23
End: 2018-09-19

## 2018-09-28 ENCOUNTER — APPOINTMENT (OUTPATIENT)
Dept: MRI IMAGING | Facility: HOSPITAL | Age: 23
End: 2018-09-28
Payer: COMMERCIAL

## 2018-09-28 ENCOUNTER — OUTPATIENT (OUTPATIENT)
Dept: OUTPATIENT SERVICES | Facility: HOSPITAL | Age: 23
LOS: 1 days | End: 2018-09-28
Payer: COMMERCIAL

## 2018-09-28 DIAGNOSIS — Z00.8 ENCOUNTER FOR OTHER GENERAL EXAMINATION: ICD-10-CM

## 2018-09-28 PROCEDURE — 74183 MRI ABD W/O CNTR FLWD CNTR: CPT

## 2018-09-28 PROCEDURE — 74183 MRI ABD W/O CNTR FLWD CNTR: CPT | Mod: 26

## 2018-09-28 PROCEDURE — A9579: CPT

## 2018-11-02 ENCOUNTER — APPOINTMENT (OUTPATIENT)
Dept: PULMONOLOGY | Facility: CLINIC | Age: 23
End: 2018-11-02
Payer: COMMERCIAL

## 2018-11-02 VITALS
HEART RATE: 78 BPM | DIASTOLIC BLOOD PRESSURE: 70 MMHG | WEIGHT: 138 LBS | BODY MASS INDEX: 27.82 KG/M2 | SYSTOLIC BLOOD PRESSURE: 120 MMHG | RESPIRATION RATE: 17 BRPM | HEIGHT: 59 IN | OXYGEN SATURATION: 92 %

## 2018-11-02 DIAGNOSIS — Q87.89 OTHER SPECIFIED CONGENITAL MALFORMATION SYNDROMES, NOT ELSEWHERE CLASSIFIED: ICD-10-CM

## 2018-11-02 PROCEDURE — 99205 OFFICE O/P NEW HI 60 MIN: CPT | Mod: 25

## 2018-11-02 PROCEDURE — 94729 DIFFUSING CAPACITY: CPT

## 2018-11-02 PROCEDURE — 94727 GAS DIL/WSHOT DETER LNG VOL: CPT

## 2018-11-02 PROCEDURE — 94618 PULMONARY STRESS TESTING: CPT

## 2018-11-02 PROCEDURE — 94060 EVALUATION OF WHEEZING: CPT | Mod: 59

## 2018-11-02 PROCEDURE — ZZZZZ: CPT

## 2018-11-16 ENCOUNTER — APPOINTMENT (OUTPATIENT)
Dept: PULMONOLOGY | Facility: CLINIC | Age: 23
End: 2018-11-16

## 2018-12-13 ENCOUNTER — RX RENEWAL (OUTPATIENT)
Age: 23
End: 2018-12-13

## 2018-12-13 RX ORDER — BUDESONIDE AND FORMOTEROL FUMARATE DIHYDRATE 80; 4.5 UG/1; UG/1
80-4.5 AEROSOL RESPIRATORY (INHALATION) TWICE DAILY
Qty: 1 | Refills: 1 | Status: ACTIVE | COMMUNITY
Start: 2018-11-02 | End: 1900-01-01

## 2018-12-14 ENCOUNTER — APPOINTMENT (OUTPATIENT)
Dept: PULMONOLOGY | Facility: CLINIC | Age: 23
End: 2018-12-14
Payer: COMMERCIAL

## 2018-12-14 VITALS
WEIGHT: 138 LBS | OXYGEN SATURATION: 99 % | DIASTOLIC BLOOD PRESSURE: 80 MMHG | HEIGHT: 59 IN | SYSTOLIC BLOOD PRESSURE: 110 MMHG | BODY MASS INDEX: 27.82 KG/M2 | RESPIRATION RATE: 17 BRPM | HEART RATE: 91 BPM

## 2018-12-14 PROCEDURE — 99214 OFFICE O/P EST MOD 30 MIN: CPT

## 2018-12-14 RX ORDER — ALBUTEROL SULFATE 90 UG/1
108 (90 BASE) AEROSOL, METERED RESPIRATORY (INHALATION)
Qty: 1 | Refills: 2 | Status: ACTIVE | COMMUNITY
Start: 2018-12-14 | End: 1900-01-01

## 2019-03-06 ENCOUNTER — APPOINTMENT (OUTPATIENT)
Dept: HEPATOLOGY | Facility: CLINIC | Age: 24
End: 2019-03-06

## 2019-03-06 ENCOUNTER — APPOINTMENT (OUTPATIENT)
Dept: HEPATOLOGY | Facility: CLINIC | Age: 24
End: 2019-03-06
Payer: COMMERCIAL

## 2019-03-06 VITALS
HEIGHT: 59 IN | WEIGHT: 133 LBS | DIASTOLIC BLOOD PRESSURE: 75 MMHG | HEART RATE: 86 BPM | RESPIRATION RATE: 17 BRPM | SYSTOLIC BLOOD PRESSURE: 115 MMHG | TEMPERATURE: 97.7 F | BODY MASS INDEX: 26.81 KG/M2

## 2019-03-06 DIAGNOSIS — K76.6 PORTAL HYPERTENSION: ICD-10-CM

## 2019-03-06 DIAGNOSIS — Z86.79 PERSONAL HISTORY OF OTHER DISEASES OF THE CIRCULATORY SYSTEM: ICD-10-CM

## 2019-03-06 DIAGNOSIS — I81 PORTAL VEIN THROMBOSIS: ICD-10-CM

## 2019-03-06 DIAGNOSIS — I85.00 ESOPHAGEAL VARICES W/OUT BLEEDING: ICD-10-CM

## 2019-03-06 PROCEDURE — 99213 OFFICE O/P EST LOW 20 MIN: CPT

## 2019-03-06 NOTE — REVIEW OF SYSTEMS
[Cough] : cough [Easy Bruising] : a tendency for easy bruising [Fever] : no fever [Chills] : no chills [Feeling Tired] : not feeling tired [Recent Weight Gain (___ Lbs)] : no recent weight gain [Recent Weight Loss (___ Lbs)] : no recent weight loss [Eye Pain] : no eye pain [Red Eyes] : eyes not red [Chest Pain] : no chest pain [Palpitations] : no palpitations [Lower Ext Edema] : no lower extremity edema [Shortness Of Breath] : no shortness of breath [Wheezing] : no wheezing [Abdominal Pain] : no abdominal pain [Vomiting] : no vomiting [Constipation] : no constipation [Diarrhea] : no diarrhea [Heartburn] : no heartburn [Melena] : no melena [Itching] : no itching [Confused] : no confusion [Convulsions] : no convulsions [FreeTextEntry3] : denies icteric eyes [FreeTextEntry7] : patient denies nausea, hematemesis, rectal bleeding or jaundice

## 2019-03-06 NOTE — CONSULT LETTER
[Dear  ___] : Dear  [unfilled], [Courtesy Letter:] : I had the pleasure of seeing your patient, [unfilled], in my office today. [Please see my note below.] : Please see my note below. [Consult Closing:] : Thank you very much for allowing me to participate in the care of this patient.  If you have any questions, please do not hesitate to contact me. [Sincerely,] : Sincerely, [Abraham Andrade M.D.] : Abraham Andrade M.D. [FreeTextEntry2] : Diaz Zelaya MD

## 2019-03-06 NOTE — ASSESSMENT
[FreeTextEntry1] : A  24 year-old woman with history of branchio-elizabeth-renal syndrome, congenital cataracts, ASD, migraine headaches, PCOS, depression and anxiety, MW tears, atrophic left kidney, anemia s/p hematemesis returns for followup regarding portal hypertension with splenomegaly, esophageal varices and history of variceal banding  \par \par She has had thrombocytopenia for years. She has anemia with Hb around 10 in 6/2018  She never exposed to hepatitis B or C.  \par \par She is s/p hematemesis in July 2018, and became more anemia with Hb 7 . An urgent EGD on 8/7/2018 showed PHG,  small esophageal varices without stigmata of bleeding, scarred distal esophagus consistent with prior banding,  small gastric varices in the cardia without stigmata of bleeding.  Repeat Hb was 8.5. Patient did not require blood transfusion. 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 \par \par She had no recent blood work for review. \par \par Patient has non cirrhotic portal hypertension from portal vein thrombosis with cavernous formation, splenomegaly with thrombocytopenia, esophageal varices s/p bleeding and banding in the past, and gastric varices never bled. She chronic anemia as well. She has no ascites or PSE. \par \par I have explained to the patient the natural history of PVT with portal hypertension, esophageal varices bleeding post banding, gastric varices and thrombocytopenia.\par \par I have requested blood work and an abdominal US with Doppler today and asked her to call for results. I would recommend a nonselective beta blocker or carvedilol for her portal hypertension with history of GEV and post EV banding for EV bleed,\par \par I have advised her to return for followup in 6 months or sooner if needed.

## 2019-03-06 NOTE — PHYSICAL EXAM
[Splenomegaly] : splenomegaly [General Appearance - Alert] : alert [General Appearance - In No Acute Distress] : in no acute distress [General Appearance - Well Nourished] : well nourished [Sclera] : the sclera and conjunctiva were normal [] : no respiratory distress [Auscultation Breath Sounds / Voice Sounds] : lungs were clear to auscultation bilaterally [Heart Rate And Rhythm] : heart rate was normal and rhythm regular [Heart Sounds] : normal S1 and S2 [Edema] : there was no peripheral edema [Bowel Sounds] : normal bowel sounds [Abdomen Tenderness] : non-tender [Spleen Enlargement] : was enlarged [Oriented To Time, Place, And Person] : oriented to person, place, and time [Scleral Icterus] : No Scleral Icterus [Abdominal  Ascites] : no ascites [Liver Palpable ___ Finger Breadths Below Costal Margin] : was not palpable below costal margin [Asterixis] : no asterixis observed [Jaundice] : No jaundice [FreeTextEntry1] : short stature [Liver Enlargement] : was not enlarged

## 2019-03-06 NOTE — HISTORY OF PRESENT ILLNESS
[___ Month(s) Ago] : [unfilled] month(s) ago [None] : ~he/she~ had no significant interval events [de-identified] : A  24 year-old woman with history of branchio-elizabeth-renal syndrome, congenital cataracts, ASD, migraine headaches, PCOS, depression and anxiety, MW tears, atrophic left kidney, anemia s/p hematemesis returns for followup regarding portal hypertension with splenomegaly, esophageal varices and history of variceal banding  \par \par Patient developed esophageal variceal bleeding at age of 1,  diagnosed with portal hypertension from PVT, followed by EV banding about 4 times at age 2-5, s/p blood transfusion 2 times in the past. \par She has had chronic cough without GERD s/p MW tear with bleeding,  in 2013 and 4/2018 respectively. \par \par 3/31/2018 presented with GI bleeding, WBC 9.3, Hb 12,  K, INR 1.27 \par EGD from 4/1/2018 reported mild portal hypertensive gastropathy, gastric erosions and possible gastric varices and evidence of previous EV banding. \par \par 4/2/2018 AST 19, ALT 18, aLP 64, TB 0.8, WBC 4.8, Hb 9.3, PLT 72 K\par \par 6/2018 never exposed to HBV and HCV, INR normal, WBC 4.6, Hb 10.7, PLT 95 K AST 29, ALT 42, ALP 77, TB 0.5, Na 138, Cr 0.73\par \par She developed coffee ground vomitus in late July 2018 when she was in [facility], found to be anemic and scheduled for an outpatient EGD. However, anemia worsened in early August, Hb was 7.0, seen at [facility] ER  on 8/6. She did not require blood transfusion, repeat Hb was 8.5 ? and an urgent EGD on 8/7 showed PHG,  small esophageal varices without stigmata of bleeding, scarred distal esophagus consistent with prior banding,  small gastric varices in the cardia without stigmata of bleeding. \par \par Interval and current history:\par Patient was seen 6 months ago. 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 \par \par She had no recent blood work for review. \par She feels fine and offers no complaints.\par ROS: as below

## 2019-03-28 ENCOUNTER — FORM ENCOUNTER (OUTPATIENT)
Age: 24
End: 2019-03-28

## 2019-03-29 ENCOUNTER — APPOINTMENT (OUTPATIENT)
Dept: ULTRASOUND IMAGING | Facility: CLINIC | Age: 24
End: 2019-03-29
Payer: COMMERCIAL

## 2019-03-29 ENCOUNTER — OUTPATIENT (OUTPATIENT)
Dept: OUTPATIENT SERVICES | Facility: HOSPITAL | Age: 24
LOS: 1 days | End: 2019-03-29
Payer: COMMERCIAL

## 2019-03-29 DIAGNOSIS — K76.6 PORTAL HYPERTENSION: ICD-10-CM

## 2019-03-29 DIAGNOSIS — I81 PORTAL VEIN THROMBOSIS: ICD-10-CM

## 2019-03-29 DIAGNOSIS — I85.00 ESOPHAGEAL VARICES WITHOUT BLEEDING: ICD-10-CM

## 2019-03-29 PROCEDURE — 76856 US EXAM PELVIC COMPLETE: CPT | Mod: 26

## 2019-03-29 PROCEDURE — 93976 VASCULAR STUDY: CPT | Mod: 26,59

## 2019-03-29 PROCEDURE — 76830 TRANSVAGINAL US NON-OB: CPT | Mod: 26

## 2019-03-29 PROCEDURE — 93975 VASCULAR STUDY: CPT

## 2019-03-29 PROCEDURE — 76856 US EXAM PELVIC COMPLETE: CPT

## 2019-03-29 PROCEDURE — 76700 US EXAM ABDOM COMPLETE: CPT | Mod: 26,59

## 2019-03-29 PROCEDURE — 76830 TRANSVAGINAL US NON-OB: CPT

## 2019-04-04 ENCOUNTER — APPOINTMENT (OUTPATIENT)
Dept: PULMONOLOGY | Facility: CLINIC | Age: 24
End: 2019-04-04
Payer: COMMERCIAL

## 2019-04-04 VITALS
RESPIRATION RATE: 16 BRPM | DIASTOLIC BLOOD PRESSURE: 80 MMHG | WEIGHT: 130 LBS | HEIGHT: 59 IN | SYSTOLIC BLOOD PRESSURE: 120 MMHG | OXYGEN SATURATION: 98 % | HEART RATE: 88 BPM | BODY MASS INDEX: 26.21 KG/M2

## 2019-04-04 PROCEDURE — 99214 OFFICE O/P EST MOD 30 MIN: CPT

## 2019-04-04 NOTE — PROCEDURE
[FreeTextEntry1] : PFT 11/02/2018 personally reviewed mild obstructive ventilatory defect moderate gas exchange abnormality and significant bronchodilator response

## 2019-04-04 NOTE — ASSESSMENT
[FreeTextEntry1] : 23 year old female recently diagnosed cough variant asthma, likely portopulmonary shunt presents for follow up doing well without respiratory complaints.\par \par Continue Symbicort 80/4.5 2 puffs daily\par Rescue inhaler as needed \par \par Follow up 4 months

## 2019-04-04 NOTE — REVIEW OF SYSTEMS
[Cough] : cough [Anemia] : anemia [Depression] : depression [Anxiety] : anxiety [As Noted in HPI] : as noted in HPI [Hepatic Disease] : hepatic disease [Negative] : Sleep Disorder

## 2019-04-04 NOTE — HISTORY OF PRESENT ILLNESS
[FreeTextEntry1] : Patient is a 23 year old female complicated medical history including portal hypertension, esophageal varices, congenital cataracts, PCOS, branchio-elizabeth-renal syndrome, cough variant asthma presents to [facility] for follow up.  Patient reports she is moving to [place].  She is to be .  She is using Symbicort 80/4.5 2 puffs daily with good symptom control.  She is here for follow up

## 2019-04-04 NOTE — PHYSICAL EXAM
[General Appearance - Well Developed] : well developed [General Appearance - Well Nourished] : well nourished [General Appearance - In No Acute Distress] : no acute distress [Normal Conjunctiva] : the conjunctiva exhibited no abnormalities [IV] : IV [] : the neck was supple [Jugular Venous Distention Increased] : there was no jugular-venous distention [Heart Sounds] : normal S1 and S2 [Murmurs] : no murmurs present [Respiration, Rhythm And Depth] : normal respiratory rhythm and effort [Auscultation Breath Sounds / Voice Sounds] : lungs were clear to auscultation bilaterally [Abdomen Soft] : soft [Abnormal Walk] : normal gait [Nail Clubbing] : no clubbing of the fingernails [Cyanosis, Localized] : no localized cyanosis [Cranial Nerves] : cranial nerves 2-12 were intact [Oriented To Time, Place, And Person] : oriented to person, place, and time [Erythema] : no erythema of the pharynx

## 2019-05-14 ENCOUNTER — APPOINTMENT (OUTPATIENT)
Dept: PULMONOLOGY | Facility: CLINIC | Age: 24
End: 2019-05-14

## 2019-05-15 ENCOUNTER — APPOINTMENT (OUTPATIENT)
Dept: PULMONOLOGY | Facility: CLINIC | Age: 24
End: 2019-05-15
Payer: COMMERCIAL

## 2019-05-15 VITALS
RESPIRATION RATE: 16 BRPM | BODY MASS INDEX: 25.4 KG/M2 | HEART RATE: 86 BPM | WEIGHT: 126 LBS | DIASTOLIC BLOOD PRESSURE: 65 MMHG | OXYGEN SATURATION: 100 % | HEIGHT: 59 IN | SYSTOLIC BLOOD PRESSURE: 108 MMHG

## 2019-05-15 DIAGNOSIS — J45.991 COUGH VARIANT ASTHMA: ICD-10-CM

## 2019-05-15 PROCEDURE — 99214 OFFICE O/P EST MOD 30 MIN: CPT

## 2019-05-15 RX ORDER — PREDNISONE 10 MG/1
10 TABLET ORAL DAILY
Qty: 14 | Refills: 1 | Status: ACTIVE | COMMUNITY
Start: 2019-05-15 | End: 1900-01-01

## 2019-05-15 RX ORDER — IPRATROPIUM BROMIDE AND ALBUTEROL SULFATE 2.5; .5 MG/3ML; MG/3ML
0.5-2.5 (3) SOLUTION RESPIRATORY (INHALATION)
Qty: 1 | Refills: 0 | Status: ACTIVE | COMMUNITY
Start: 2019-05-15 | End: 1900-01-01

## 2019-05-15 NOTE — ASSESSMENT
[FreeTextEntry1] : 23 year old female recently diagnosed cough variant asthma, likely portopulmonary shunt presents for follow up doing well without respiratory complaints.\par \par Will increase Symbicort dosing to 160/4.5 2 puffs twice daily\par Rescue inhaler as needed \par Prednisone 20 mg daily x 7 days\par Nebulizer ordered.\par Albuterol/Ipratropium every 4 hours if needed.\par \par Follow up 2 weeks\par

## 2019-05-15 NOTE — REVIEW OF SYSTEMS
[Cough] : cough [Anemia] : anemia [Depression] : depression [Anxiety] : anxiety [Hepatic Disease] : hepatic disease [As Noted in HPI] : as noted in HPI [Negative] : Sleep Disorder

## 2019-05-15 NOTE — HISTORY OF PRESENT ILLNESS
[FreeTextEntry1] : Patient is a 23 year old female complicated medical history including portal hypertension, esophageal varices, congenital cataracts, PCOS, branchio-elizabeth-renal syndrome, cough variant asthma presents to [facility] for follow up.Patient had planned to move to [place] however this did not work out.  She is experiencing increased dry cough.  She denies fever or chills or systemic complaints.

## 2019-05-15 NOTE — PHYSICAL EXAM
[General Appearance - Well Developed] : well developed [General Appearance - Well Nourished] : well nourished [General Appearance - In No Acute Distress] : no acute distress [Normal Conjunctiva] : the conjunctiva exhibited no abnormalities [IV] : IV [] : the neck was supple [Jugular Venous Distention Increased] : there was no jugular-venous distention [Heart Sounds] : normal S1 and S2 [Murmurs] : no murmurs present [Respiration, Rhythm And Depth] : normal respiratory rhythm and effort [Auscultation Breath Sounds / Voice Sounds] : lungs were clear to auscultation bilaterally [Abnormal Walk] : normal gait [Abdomen Soft] : soft [Cyanosis, Localized] : no localized cyanosis [Nail Clubbing] : no clubbing of the fingernails [Cranial Nerves] : cranial nerves 2-12 were intact [Oriented To Time, Place, And Person] : oriented to person, place, and time [Erythema] : no erythema of the pharynx

## 2019-05-17 RX ORDER — ALBUTEROL SULFATE 0.63 MG/3ML
0.63 SOLUTION RESPIRATORY (INHALATION) 4 TIMES DAILY
Qty: 1 | Refills: 2 | Status: ACTIVE | COMMUNITY
Start: 2019-05-17 | End: 1900-01-01

## 2019-07-03 ENCOUNTER — INPATIENT (INPATIENT)
Facility: HOSPITAL | Age: 24
LOS: 2 days | Discharge: HOME | DRG: 442 | End: 2019-07-06
Attending: INTERNAL MEDICINE | Admitting: INTERNAL MEDICINE
Payer: COMMERCIAL

## 2019-07-03 VITALS
WEIGHT: 128.09 LBS | HEART RATE: 95 BPM | DIASTOLIC BLOOD PRESSURE: 64 MMHG | TEMPERATURE: 98 F | SYSTOLIC BLOOD PRESSURE: 106 MMHG | OXYGEN SATURATION: 100 % | RESPIRATION RATE: 18 BRPM | HEIGHT: 59 IN

## 2019-07-03 DIAGNOSIS — D64.9 ANEMIA, UNSPECIFIED: ICD-10-CM

## 2019-07-03 DIAGNOSIS — I85.00 ESOPHAGEAL VARICES WITHOUT BLEEDING: ICD-10-CM

## 2019-07-03 DIAGNOSIS — F32.9 MAJOR DEPRESSIVE DISORDER, SINGLE EPISODE, UNSPECIFIED: ICD-10-CM

## 2019-07-03 DIAGNOSIS — D50.0 IRON DEFICIENCY ANEMIA SECONDARY TO BLOOD LOSS (CHRONIC): ICD-10-CM

## 2019-07-03 DIAGNOSIS — K92.2 GASTROINTESTINAL HEMORRHAGE, UNSPECIFIED: ICD-10-CM

## 2019-07-03 DIAGNOSIS — F41.9 ANXIETY DISORDER, UNSPECIFIED: ICD-10-CM

## 2019-07-03 LAB
ALBUMIN SERPL ELPH-MCNC: 4.3 G/DL — SIGNIFICANT CHANGE UP (ref 3.3–5)
ALP SERPL-CCNC: 71 U/L — SIGNIFICANT CHANGE UP (ref 40–120)
ALT FLD-CCNC: 10 U/L — SIGNIFICANT CHANGE UP (ref 10–45)
ANION GAP SERPL CALC-SCNC: 14 MMOL/L — SIGNIFICANT CHANGE UP (ref 5–17)
APTT BLD: 28.5 SEC — SIGNIFICANT CHANGE UP (ref 27.5–36.3)
AST SERPL-CCNC: 14 U/L — SIGNIFICANT CHANGE UP (ref 10–40)
BASOPHILS # BLD AUTO: 0 K/UL — SIGNIFICANT CHANGE UP (ref 0–0.2)
BASOPHILS NFR BLD AUTO: 0.1 % — SIGNIFICANT CHANGE UP (ref 0–2)
BILIRUB SERPL-MCNC: 0.6 MG/DL — SIGNIFICANT CHANGE UP (ref 0.2–1.2)
BLD GP AB SCN SERPL QL: NEGATIVE — SIGNIFICANT CHANGE UP
BLD GP AB SCN SERPL QL: NEGATIVE — SIGNIFICANT CHANGE UP
BUN SERPL-MCNC: 7 MG/DL — SIGNIFICANT CHANGE UP (ref 7–23)
CALCIUM SERPL-MCNC: 9.4 MG/DL — SIGNIFICANT CHANGE UP (ref 8.4–10.5)
CHLORIDE SERPL-SCNC: 105 MMOL/L — SIGNIFICANT CHANGE UP (ref 96–108)
CO2 SERPL-SCNC: 19 MMOL/L — LOW (ref 22–31)
CREAT SERPL-MCNC: 0.66 MG/DL — SIGNIFICANT CHANGE UP (ref 0.5–1.3)
EOSINOPHIL # BLD AUTO: 0 K/UL — SIGNIFICANT CHANGE UP (ref 0–0.5)
EOSINOPHIL NFR BLD AUTO: 0.4 % — SIGNIFICANT CHANGE UP (ref 0–6)
GLUCOSE SERPL-MCNC: 97 MG/DL — SIGNIFICANT CHANGE UP (ref 70–99)
HCT VFR BLD CALC: 20.9 % — CRITICAL LOW (ref 34.5–45)
HGB BLD-MCNC: 6.5 G/DL — CRITICAL LOW (ref 11.5–15.5)
INR BLD: 1.2 RATIO — HIGH (ref 0.88–1.16)
LYMPHOCYTES # BLD AUTO: 1.1 K/UL — SIGNIFICANT CHANGE UP (ref 1–3.3)
LYMPHOCYTES # BLD AUTO: 20.9 % — SIGNIFICANT CHANGE UP (ref 13–44)
MCHC RBC-ENTMCNC: 25.2 PG — LOW (ref 27–34)
MCHC RBC-ENTMCNC: 31 GM/DL — LOW (ref 32–36)
MCV RBC AUTO: 81.3 FL — SIGNIFICANT CHANGE UP (ref 80–100)
MONOCYTES # BLD AUTO: 0.4 K/UL — SIGNIFICANT CHANGE UP (ref 0–0.9)
MONOCYTES NFR BLD AUTO: 6.9 % — SIGNIFICANT CHANGE UP (ref 2–14)
NEUTROPHILS # BLD AUTO: 3.7 K/UL — SIGNIFICANT CHANGE UP (ref 1.8–7.4)
NEUTROPHILS NFR BLD AUTO: 71.7 % — SIGNIFICANT CHANGE UP (ref 43–77)
OB PNL STL: NEGATIVE — SIGNIFICANT CHANGE UP
PLATELET # BLD AUTO: 120 K/UL — LOW (ref 150–400)
POTASSIUM SERPL-MCNC: 3 MMOL/L — LOW (ref 3.5–5.3)
POTASSIUM SERPL-SCNC: 3 MMOL/L — LOW (ref 3.5–5.3)
PROT SERPL-MCNC: 6.8 G/DL — SIGNIFICANT CHANGE UP (ref 6–8.3)
PROTHROM AB SERPL-ACNC: 13.7 SEC — HIGH (ref 10–12.9)
RBC # BLD: 2.57 M/UL — LOW (ref 3.8–5.2)
RBC # FLD: 18.8 % — HIGH (ref 10.3–14.5)
RH IG SCN BLD-IMP: POSITIVE — SIGNIFICANT CHANGE UP
RH IG SCN BLD-IMP: POSITIVE — SIGNIFICANT CHANGE UP
SODIUM SERPL-SCNC: 138 MMOL/L — SIGNIFICANT CHANGE UP (ref 135–145)
WBC # BLD: 5.1 K/UL — SIGNIFICANT CHANGE UP (ref 3.8–10.5)
WBC # FLD AUTO: 5.1 K/UL — SIGNIFICANT CHANGE UP (ref 3.8–10.5)

## 2019-07-03 PROCEDURE — 99285 EMERGENCY DEPT VISIT HI MDM: CPT

## 2019-07-03 RX ORDER — ALPRAZOLAM 0.25 MG
1 TABLET ORAL
Qty: 0 | Refills: 0 | DISCHARGE

## 2019-07-03 RX ORDER — ALPRAZOLAM 0.25 MG
1 TABLET ORAL
Refills: 0 | Status: DISCONTINUED | OUTPATIENT
Start: 2019-07-03 | End: 2019-07-06

## 2019-07-03 RX ORDER — LANOLIN ALCOHOL/MO/W.PET/CERES
1 CREAM (GRAM) TOPICAL
Qty: 0 | Refills: 0 | DISCHARGE

## 2019-07-03 RX ORDER — OCTREOTIDE ACETATE 200 UG/ML
50 INJECTION, SOLUTION INTRAVENOUS; SUBCUTANEOUS
Qty: 500 | Refills: 0 | Status: DISCONTINUED | OUTPATIENT
Start: 2019-07-03 | End: 2019-07-05

## 2019-07-03 RX ORDER — FERROUS SULFATE 325(65) MG
1 TABLET ORAL
Qty: 0 | Refills: 0 | DISCHARGE

## 2019-07-03 RX ORDER — PANTOPRAZOLE SODIUM 20 MG/1
40 TABLET, DELAYED RELEASE ORAL
Refills: 0 | Status: DISCONTINUED | OUTPATIENT
Start: 2019-07-03 | End: 2019-07-05

## 2019-07-03 RX ORDER — CEFTRIAXONE 500 MG/1
1000 INJECTION, POWDER, FOR SOLUTION INTRAMUSCULAR; INTRAVENOUS ONCE
Refills: 0 | Status: COMPLETED | OUTPATIENT
Start: 2019-07-03 | End: 2019-07-04

## 2019-07-03 RX ORDER — ALPRAZOLAM 0.25 MG
0.5 TABLET ORAL DAILY
Refills: 0 | Status: DISCONTINUED | OUTPATIENT
Start: 2019-07-03 | End: 2019-07-06

## 2019-07-03 RX ORDER — ONDANSETRON 8 MG/1
4 TABLET, FILM COATED ORAL ONCE
Refills: 0 | Status: COMPLETED | OUTPATIENT
Start: 2019-07-03 | End: 2019-07-03

## 2019-07-03 RX ORDER — ACETAMINOPHEN 500 MG
650 TABLET ORAL ONCE
Refills: 0 | Status: COMPLETED | OUTPATIENT
Start: 2019-07-03 | End: 2019-07-03

## 2019-07-03 RX ORDER — POTASSIUM CHLORIDE 20 MEQ
40 PACKET (EA) ORAL ONCE
Refills: 0 | Status: COMPLETED | OUTPATIENT
Start: 2019-07-03 | End: 2019-07-03

## 2019-07-03 RX ORDER — SUCRALFATE 1 G
1 TABLET ORAL
Qty: 0 | Refills: 0 | DISCHARGE

## 2019-07-03 RX ORDER — ESCITALOPRAM OXALATE 10 MG/1
40 TABLET, FILM COATED ORAL DAILY
Refills: 0 | Status: DISCONTINUED | OUTPATIENT
Start: 2019-07-03 | End: 2019-07-06

## 2019-07-03 RX ADMIN — ONDANSETRON 4 MILLIGRAM(S): 8 TABLET, FILM COATED ORAL at 19:02

## 2019-07-03 RX ADMIN — Medication 650 MILLIGRAM(S): at 23:41

## 2019-07-03 RX ADMIN — Medication 1 MILLIGRAM(S): at 22:46

## 2019-07-03 RX ADMIN — Medication 0.5 MILLIGRAM(S): at 22:03

## 2019-07-03 RX ADMIN — Medication 40 MILLIEQUIVALENT(S): at 19:03

## 2019-07-03 NOTE — ED ADULT NURSE NOTE - OBJECTIVE STATEMENT
23 y/o F pt w/ pmh of iron deficiency anemia, ASD (atrial septal defect) Branchio-elizabeth-renal syndrome, thrombocytopenia, Hypersplenism, PCOS, and Esophageal varices with pshx of hernia repair, ASD (atrial septal defect) s/p repair presents to ED with abnormal lab result, pt had nausea and vomiting the last 2 days, went to PMD where he lety blood work and found her to be anemic, on exam pt report feeling tired, mild SOB, abd soft, NT, ND, + BS in all 4 quadrant, neg CVA tenderness, report dark stool put says she is taking iron pill, denies diarrhea, fever, chills, dysuria, hematuria, seen and eval by ander PHAM placed, labs drawn and sent

## 2019-07-03 NOTE — ED PROVIDER NOTE - CLINICAL SUMMARY MEDICAL DECISION MAKING FREE TEXT BOX
24 F with pmhx of anemia, esophageal varices, izzy carroll tear, pw intermittent red and dark colored vomitus and anemia. Sent in by PMD for transfusion. Repeat labs, occoult blood. Most likely source of bleeding is upper GI, Pt will likely be admitted for endoscopy. 24 F with pmhx of anemia, esophageal varices, izzy carroll tear, pw intermittent red and dark colored vomitus and anemia. Sent in by PMD for transfusion. Repeat labs, occoult blood. Most likely source of bleeding is upper GI, Pt will likely be admitted for endoscopy.  ATTG: Dr. Townsend

## 2019-07-03 NOTE — ED PROVIDER NOTE - PHYSICAL EXAMINATION
General: Patient awake alert NAD.   HEENT: normocephalic, atraumatic, EMOI, no scleral icterus.   Cardiac: RRR, S1, S2, + systolic murmur, no rubs, no gallop.   LUNGS: CTA B/L no wheeze, rhonchi, rales.   Abdomen: soft NT, ND, no rebound no guarding, no CVA tenderness.   EXT: Moving all extremities, no edema.    Neuro: A&Ox3, gait normal, no focal neurological deficits, CN 2-12 grossly intact  Skin: warm, dry, no rash, no lesions

## 2019-07-03 NOTE — H&P ADULT - GASTROINTESTINAL DETAILS
no rebound tenderness/no rigidity/no masses palpable/no bruit/no guarding/bowel sounds normal/normal/soft/nontender/no distention

## 2019-07-03 NOTE — H&P ADULT - RS GEN PE MLT RESP DETAILS PC
good air movement/no intercostal retractions/airway patent/no chest wall tenderness/breath sounds equal/respirations non-labored/clear to auscultation bilaterally/normal

## 2019-07-03 NOTE — PATIENT PROFILE ADULT - PRO INTERPRETER NEED 2
SUMMARY (A/P):    34-year-old gentleman referred for right inguinal pain with no finding of recurrent hernia or other abnormality on exam.  This probably represents groin strain and I went over conservative measures as well as prescribing Mobic 15 mg by mouth daily for 2 weeks.  Return if his pain worsens or does not resolve.      CC:    Right inguinal pain, referred for consultation by Dr. Sultana    HPI:    34-year-old gentleman presents with 1 month history of moderate intermittent right inguinal pain radiating to the leg with no other associated symptoms.  He had similar episode in 2016 which resolved spontaneously.    PSH:    Open right inguinal hernia repair 2008    PMH:    Unremarkable    FAMILY HISTORY:    Reviewed and noncontributory to current presentation    SOCIAL HISTORY:   Denies tobacco use  Occasional alcohol use    ALLERGIES: reviewed, in Epic    MEDICATIONS: reviewed, in Epic    ROS:  No chest pain or shortness of air.  All other systems reviewed and negative other than presenting complaints.    PHYSICAL EXAM:   Constitutional: Well-developed well-nourished, no acute distress  Vital signs: Heart rate 80, respirations 18, weight 209 pounds, height 66 inches, BMI 33.7  Eyes: Conjunctiva normal, sclera nonicteric  ENMT: Hearing grossly normal, oral mucosa moist  Cardiovascular: Regular rate, no jugular venous distention  Gastrointestinal: Soft, nontender, no palpable mass  Genitourinary: Testicles are descended and normal.  Well-healed right inguinal hernia incision.  No visible or palpable evidence of hernia.  Normal external rings bilaterally.  Lymphatics (palpable nodes):  Inguinal-negative  Skin:  Warm, dry, no rash on visualized skin surfaces  Musculoskeletal: Symmetric strength, normal gait  Psychiatric: Alert and oriented ×3, normal affect     SAAD ESPINOSA M.D.     English

## 2019-07-03 NOTE — ED CLERICAL - NS ED CLERK NOTE PRE-ARRIVAL INFORMATION; ADDITIONAL PRE-ARRIVAL INFORMATION
CC/Reason For referral: vomiting, hemoglobin 5.4  Preferred Consultant(if applicable):wes Andrade  Who admits for you (if needed):  Do you have documents you would like to fax over?  Would you still like to speak to an ED attending? please call after patient is seen

## 2019-07-03 NOTE — H&P ADULT - NEGATIVE CARDIOVASCULAR SYMPTOMS
no orthopnea/no peripheral edema/no dyspnea on exertion/no claudication/no palpitations/no chest pain/no paroxysmal nocturnal dyspnea

## 2019-07-03 NOTE — ED PROVIDER NOTE - OBJECTIVE STATEMENT
24 year old F with pmhx of iron deficiency anemia, ASD (atrial septal defect) Branchio-elizabeth-renal syndrome, thrombocytopenia, Hypersplenism  , PCOS, and Esophageal varices with pshx of hernia repair, ASD (atrial septal defect)  s/p repair  presents to ED with abnormal lab result (5.5 Hb). +nausea and NBNB vomiting yesterday. Reports black stools, but believes it might be due to iron supplements Went to see her PMD today where they performed bloodwork and found pt to be anemic, and referred to ED for transfusion. Denies bruising, weakness, lightheadedness, dysuria, hematuria, CP, and SOB. Not currently menstruating.  Hx of GI bleed and low Hb, has had transfusions in the past. Denies smoking, drinking, or drug use. PCP: Dr. Diaz Zelaya 24 year old F with pmhx of iron deficiency anemia, ASD (atrial septal defect) Branchio-elizabeth-renal syndrome, thrombocytopenia, Hypersplenism  , PCOS, and Esophageal varices with pshx of hernia repair, ASD (atrial septal defect)  s/p repair  presents to ED with abnormal lab result (5.5 Hb). +nausea and NBNB vomiting yesterday, one red and one dark. Reports black stools, but believes it might be due to iron supplements Went to see her PMD today where they performed bloodwork and found pt to be anemic, and referred to ED for transfusion. Denies bruising, weakness, lightheadedness, dysuria, hematuria, CP, and SOB. Not currently menstruating.  Hx of GI bleed and low Hb, has had transfusions in the past. Denies smoking, drinking, or drug use. PCP: Dr. Diaz Zelaya

## 2019-07-03 NOTE — PATIENT PROFILE ADULT - NSPROEDALEARNPREF_GEN_A_NUR
written material/video/skill demonstration/individual instruction/verbal instruction/pictorial/audio/computer/internet

## 2019-07-03 NOTE — H&P ADULT - HISTORY OF PRESENT ILLNESS
24 year old F with pmhx of iron deficiency anemia, ASD (atrial septal defect) Branchio-elizabeth-renal syndrome, thrombocytopenia, Hypersplenism  , PCOS, and Esophageal varices with pshx of hernia repair, ASD (atrial septal defect)  s/p repair  presents to ED with abnormal lab result (5.5 Hb). +nausea and NBNB vomiting yesterday, one red and one dark. Reports black stools, but believes it might be due to iron supplements Went to see her PMD today where they performed bloodwork and found pt to be anemic, and referred to ED for transfusion. Denies bruising, weakness, lightheadedness, dysuria, hematuria, CP, and SOB. Not currently menstruating.  Hx of GI bleed and low Hb, has had transfusions in the past. Denies smoking, drinking, or drug use. PCP: Dr. Diaz Zelaya

## 2019-07-03 NOTE — H&P ADULT - NSICDXPASTSURGICALHX_GEN_ALL_CORE_FT
PAST SURGICAL HISTORY:  ASD (atrial septal defect) s/p repair    Cataract both eyes - cataract removal with lens implantation    Hernia bilateral hernia repair    Megaureter left (1994)    Varices, esophageal s/p banding

## 2019-07-04 LAB
ALBUMIN SERPL ELPH-MCNC: 4.1 G/DL — SIGNIFICANT CHANGE UP (ref 3.3–5)
ALP SERPL-CCNC: 69 U/L — SIGNIFICANT CHANGE UP (ref 40–120)
ALT FLD-CCNC: 10 U/L — SIGNIFICANT CHANGE UP (ref 10–45)
ANION GAP SERPL CALC-SCNC: 12 MMOL/L — SIGNIFICANT CHANGE UP (ref 5–17)
AST SERPL-CCNC: 13 U/L — SIGNIFICANT CHANGE UP (ref 10–40)
BILIRUB SERPL-MCNC: 1.6 MG/DL — HIGH (ref 0.2–1.2)
BUN SERPL-MCNC: 6 MG/DL — LOW (ref 7–23)
CALCIUM SERPL-MCNC: 9.2 MG/DL — SIGNIFICANT CHANGE UP (ref 8.4–10.5)
CHLORIDE SERPL-SCNC: 106 MMOL/L — SIGNIFICANT CHANGE UP (ref 96–108)
CO2 SERPL-SCNC: 21 MMOL/L — LOW (ref 22–31)
CREAT SERPL-MCNC: 0.7 MG/DL — SIGNIFICANT CHANGE UP (ref 0.5–1.3)
GLUCOSE SERPL-MCNC: 87 MG/DL — SIGNIFICANT CHANGE UP (ref 70–99)
HCT VFR BLD CALC: 30 % — LOW (ref 34.5–45)
HGB BLD-MCNC: 9.5 G/DL — LOW (ref 11.5–15.5)
MCHC RBC-ENTMCNC: 27.2 PG — SIGNIFICANT CHANGE UP (ref 27–34)
MCHC RBC-ENTMCNC: 31.8 GM/DL — LOW (ref 32–36)
MCV RBC AUTO: 85.5 FL — SIGNIFICANT CHANGE UP (ref 80–100)
PLATELET # BLD AUTO: 135 K/UL — LOW (ref 150–400)
POTASSIUM SERPL-MCNC: 4.4 MMOL/L — SIGNIFICANT CHANGE UP (ref 3.5–5.3)
POTASSIUM SERPL-SCNC: 4.4 MMOL/L — SIGNIFICANT CHANGE UP (ref 3.5–5.3)
PROT SERPL-MCNC: 6.5 G/DL — SIGNIFICANT CHANGE UP (ref 6–8.3)
RBC # BLD: 3.5 M/UL — LOW (ref 3.8–5.2)
RBC # FLD: 18 % — HIGH (ref 10.3–14.5)
SODIUM SERPL-SCNC: 139 MMOL/L — SIGNIFICANT CHANGE UP (ref 135–145)
WBC # BLD: 5.4 K/UL — SIGNIFICANT CHANGE UP (ref 3.8–10.5)
WBC # FLD AUTO: 5.4 K/UL — SIGNIFICANT CHANGE UP (ref 3.8–10.5)

## 2019-07-04 PROCEDURE — 99222 1ST HOSP IP/OBS MODERATE 55: CPT | Mod: GC

## 2019-07-04 RX ORDER — ACETAMINOPHEN 500 MG
650 TABLET ORAL EVERY 6 HOURS
Refills: 0 | Status: DISCONTINUED | OUTPATIENT
Start: 2019-07-04 | End: 2019-07-06

## 2019-07-04 RX ORDER — ACETAMINOPHEN 500 MG
650 TABLET ORAL ONCE
Refills: 0 | Status: COMPLETED | OUTPATIENT
Start: 2019-07-04 | End: 2019-07-04

## 2019-07-04 RX ORDER — ONDANSETRON 8 MG/1
4 TABLET, FILM COATED ORAL ONCE
Refills: 0 | Status: COMPLETED | OUTPATIENT
Start: 2019-07-04 | End: 2019-07-05

## 2019-07-04 RX ORDER — ALPRAZOLAM 0.25 MG
0.5 TABLET ORAL ONCE
Refills: 0 | Status: DISCONTINUED | OUTPATIENT
Start: 2019-07-04 | End: 2019-07-04

## 2019-07-04 RX ADMIN — Medication 650 MILLIGRAM(S): at 11:14

## 2019-07-04 RX ADMIN — Medication 0.5 MILLIGRAM(S): at 12:24

## 2019-07-04 RX ADMIN — ESCITALOPRAM OXALATE 40 MILLIGRAM(S): 10 TABLET, FILM COATED ORAL at 12:24

## 2019-07-04 RX ADMIN — CEFTRIAXONE 100 MILLIGRAM(S): 500 INJECTION, POWDER, FOR SOLUTION INTRAMUSCULAR; INTRAVENOUS at 03:21

## 2019-07-04 RX ADMIN — OCTREOTIDE ACETATE 10 MICROGRAM(S)/HR: 200 INJECTION, SOLUTION INTRAVENOUS; SUBCUTANEOUS at 12:05

## 2019-07-04 RX ADMIN — Medication 650 MILLIGRAM(S): at 06:02

## 2019-07-04 RX ADMIN — PANTOPRAZOLE SODIUM 40 MILLIGRAM(S): 20 TABLET, DELAYED RELEASE ORAL at 05:47

## 2019-07-04 RX ADMIN — Medication 650 MILLIGRAM(S): at 00:45

## 2019-07-04 RX ADMIN — Medication 650 MILLIGRAM(S): at 10:14

## 2019-07-04 RX ADMIN — Medication 1 MILLIGRAM(S): at 21:24

## 2019-07-04 RX ADMIN — OCTREOTIDE ACETATE 10 MICROGRAM(S)/HR: 200 INJECTION, SOLUTION INTRAVENOUS; SUBCUTANEOUS at 03:23

## 2019-07-04 RX ADMIN — Medication 650 MILLIGRAM(S): at 07:18

## 2019-07-04 RX ADMIN — Medication 0.5 MILLIGRAM(S): at 22:55

## 2019-07-04 RX ADMIN — PANTOPRAZOLE SODIUM 40 MILLIGRAM(S): 20 TABLET, DELAYED RELEASE ORAL at 17:04

## 2019-07-04 NOTE — CONSULT NOTE ADULT - ATTENDING COMMENTS
Patient seen and examined. Agree with Florence assessment and plan. Spoke with patient, father at bedside and mother over phone regarding history, assessment and plan. Plan for EGD tomorrow as most likely UGI source of anemia. If egd unremarkable, plan for outpatient colonoscopy and possible small bowel capsule for further work up of anemia. Currently without evidence of overt or active GI bleed.

## 2019-07-04 NOTE — CONSULT NOTE ADULT - SUBJECTIVE AND OBJECTIVE BOX
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    Pt's last EGD was 08/2018 for hemetemesis/melena revealed portal hypertensive gastropathy, likely source of bleeding, small esophageal varices w/o stigmata of bleeding. In the ED patient was found to have a hgb of 6.5, s/p 2uprbc, without overt bleeding. Pt was started on PPI IV BID.       Allergies:  No Known Allergies      Home Medications:    Hospital Medications:  ALPRAZolam 0.5 milliGRAM(s) Oral daily  ALPRAZolam 1 milliGRAM(s) Oral two times a day PRN  escitalopram 40 milliGRAM(s) Oral daily  octreotide  Infusion 50 MICROgram(s)/Hr IV Continuous <Continuous>  pantoprazole  Injectable 40 milliGRAM(s) IV Push two times a day      PMHX/PSHX:  Iron deficiency anemia  ADHD (attention deficit hyperactivity disorder)  Polycystic ovarian syndrome  Duplicate cervix  Vaginal septum  Branchio-elizabeth-renal syndrome  Esophageal varices  Portal vein thrombosis  Thrombocytopenia  Depression  Anxiety  ASD (atrial septal defect)  Hypersplenism  Portal hypertension  Cataract  Hernia  Megaureter  Varices, esophageal  ASD (atrial septal defect)      Family history:  No pertinent family history in first degree relatives      There is no family history of peptic ulcer disease, gastric cancer, colon polyps, colon cancer, celiac disease, biliary, hepatic, or pancreatic disease.  None of the female relatives have breast, uterine, or ovarian cancer.     Social History:     ROS:     General:  No wt loss, fevers, chills, night sweats, fatigue,   Eyes:  Good vision, no reported pain  ENT:  No sore throat, pain, runny nose, dysphagia  CV:  No pain, palpitations, hypo/hypertension  Resp:  No dyspnea, cough, tachypnea, wheezing  GI:  See HPI  :  No pain, bleeding, incontinence, nocturia  Muscle:  No pain, weakness  Neuro:  No weakness, tingling, memory problems  Psych:  No fatigue, insomnia, mood problems, depression  Endocrine:  No polyuria, polydipsia, cold/heat intolerance  Heme:  No petechiae, ecchymosis, easy bruisability  Skin:  No rash, tattoos, scars, edema      PHYSICAL EXAM:     GENERAL:  Appears stated age, well-groomed, well-nourished, no distress  HEENT:  NC/AT,  conjunctivae clear and pink, no thyromegaly, nodules, adenopathy, no JVD, sclera -anicteric  CHEST:  Full & symmetric excursion, no increased effort, breath sounds clear  HEART:  Regular rhythm, S1, S2, no murmur/rub/S3/S4, no abdominal bruit, no edema  ABDOMEN:  Soft, non-tender, non-distended, normoactive bowel sounds,  no masses ,no hepato-splenomegaly, no signs of chronic liver disease  EXTEREMITIES:  no cyanosis,clubbing or edema  SKIN:  No rash/erythema/ecchymoses/petechiae/wounds/abscess/warm/dry  NEURO:  Alert, oriented, no asterixis, no tremor, no encephalopathy    Vital Signs:  Vital Signs Last 24 Hrs  T(C): 36.7 (04 Jul 2019 03:15), Max: 37.1 (03 Jul 2019 19:55)  T(F): 98 (04 Jul 2019 03:15), Max: 98.8 (03 Jul 2019 19:55)  HR: 97 (04 Jul 2019 03:15) (78 - 98)  BP: 102/63 (04 Jul 2019 03:15) (102/63 - 125/68)  BP(mean): 87 (03 Jul 2019 20:31) (87 - 87)  RR: 16 (04 Jul 2019 03:15) (16 - 18)  SpO2: 100% (04 Jul 2019 03:15) (99% - 100%)  Daily Height in cm: 149.86 (03 Jul 2019 14:40)    Daily     LABS:                        6.5    5.1   )-----------( 120      ( 03 Jul 2019 16:47 )             20.9     Mean Cell Volume: 81.3 fl (07-03-19 @ 16:47)    07-03    138  |  105  |  7   ----------------------------<  97  3.0<L>   |  19<L>  |  0.66    Ca    9.4      03 Jul 2019 16:47    TPro  6.8  /  Alb  4.3  /  TBili  0.6  /  DBili  x   /  AST  14  /  ALT  10  /  AlkPhos  71  07-03    LIVER FUNCTIONS - ( 03 Jul 2019 16:47 )  Alb: 4.3 g/dL / Pro: 6.8 g/dL / ALK PHOS: 71 U/L / ALT: 10 U/L / AST: 14 U/L / GGT: x           PT/INR - ( 03 Jul 2019 16:47 )   PT: 13.7 sec;   INR: 1.20 ratio         PTT - ( 03 Jul 2019 16:47 )  PTT:28.5 sec                            6.5    5.1   )-----------( 120      ( 03 Jul 2019 16:47 )             20.9     Imaging: Chief Complaint:  Patient is a 24y old  Female who presents with a chief complaint of abnormal labs (03 Jul 2019 20:31)      HPI:    23F with PMH of branchio-elizabeth-renal syndrome with L atrophic kidney, 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 Patient states that she vomited twice however no blood seen. Pt also endorses dark stool but not black, but thought it was 2/2 iron supplementation which she takes. Pt otherwise denies abdominal pain, fever, chills, change in bowel habits, dizziness, HA. Pt's last EGD was 08/2018 for hemetemesis/melena revealed portal hypertensive gastropathy, likely source of bleeding, small esophageal varices w/o stigmata of bleeding. No reported colonoscopy in the past.  In the ED patient was found to have a hgb of 6.5, s/p 2uprbc, without overt bleeding. Pt was started on PPI IV BID. Pt sees Dr Abraham Andrade as an outpatient.       Allergies:  No Known Allergies      Home Medications:    Hospital Medications:  ALPRAZolam 0.5 milliGRAM(s) Oral daily  ALPRAZolam 1 milliGRAM(s) Oral two times a day PRN  escitalopram 40 milliGRAM(s) Oral daily  octreotide  Infusion 50 MICROgram(s)/Hr IV Continuous <Continuous>  pantoprazole  Injectable 40 milliGRAM(s) IV Push two times a day      PMHX/PSHX:  Iron deficiency anemia  ADHD (attention deficit hyperactivity disorder)  Polycystic ovarian syndrome  Duplicate cervix  Vaginal septum  Branchio-elizabeth-renal syndrome  Esophageal varices  Portal vein thrombosis  Thrombocytopenia  Depression  Anxiety  ASD (atrial septal defect)  Hypersplenism  Portal hypertension  Cataract  Hernia  Megaureter  Varices, esophageal  ASD (atrial septal defect)      Family history:  No pertinent family history in first degree relatives      There is no family history of peptic ulcer disease, gastric cancer, colon polyps, colon cancer, celiac disease, biliary, hepatic, or pancreatic disease.  None of the female relatives have breast, uterine, or ovarian cancer.     Social History:     ROS:     General:  No wt loss, fevers, chills, night sweats, fatigue,   Eyes:  Good vision, no reported pain  ENT:  No sore throat, pain, runny nose, dysphagia  CV:  No pain, palpitations, hypo/hypertension  Resp:  No dyspnea, cough, tachypnea, wheezing  GI:  See HPI  :  No pain, bleeding, incontinence, nocturia  Muscle:  No pain, weakness  Neuro:  No weakness, tingling, memory problems  Psych:  No fatigue, insomnia, mood problems, depression  Endocrine:  No polyuria, polydipsia, cold/heat intolerance  Heme:  No petechiae, ecchymosis, easy bruisability  Skin:  No rash, tattoos, scars, edema      PHYSICAL EXAM:     GENERAL:  Appears stated age  HEENT:  NC/AT,  conjunctivae clear and pink  CHEST:  Full & symmetric excursion, no increased effort, breath sounds clear  HEART:  Regular rhythm, S1, S2  ABDOMEN:  Soft, non-tender, non-distended  EXTEREMITIES:  no cyanosis,clubbing or edema  SKIN:  No rash/erythema/ecchymoses  NEURO:  Alert, oriented, no asterixis    Vital Signs:  Vital Signs Last 24 Hrs  T(C): 36.7 (04 Jul 2019 03:15), Max: 37.1 (03 Jul 2019 19:55)  T(F): 98 (04 Jul 2019 03:15), Max: 98.8 (03 Jul 2019 19:55)  HR: 97 (04 Jul 2019 03:15) (78 - 98)  BP: 102/63 (04 Jul 2019 03:15) (102/63 - 125/68)  BP(mean): 87 (03 Jul 2019 20:31) (87 - 87)  RR: 16 (04 Jul 2019 03:15) (16 - 18)  SpO2: 100% (04 Jul 2019 03:15) (99% - 100%)  Daily Height in cm: 149.86 (03 Jul 2019 14:40)    Daily     LABS:                        6.5    5.1   )-----------( 120      ( 03 Jul 2019 16:47 )             20.9     Mean Cell Volume: 81.3 fl (07-03-19 @ 16:47)    07-03    138  |  105  |  7   ----------------------------<  97  3.0<L>   |  19<L>  |  0.66    Ca    9.4      03 Jul 2019 16:47    TPro  6.8  /  Alb  4.3  /  TBili  0.6  /  DBili  x   /  AST  14  /  ALT  10  /  AlkPhos  71  07-03    LIVER FUNCTIONS - ( 03 Jul 2019 16:47 )  Alb: 4.3 g/dL / Pro: 6.8 g/dL / ALK PHOS: 71 U/L / ALT: 10 U/L / AST: 14 U/L / GGT: x           PT/INR - ( 03 Jul 2019 16:47 )   PT: 13.7 sec;   INR: 1.20 ratio         PTT - ( 03 Jul 2019 16:47 )  PTT:28.5 sec                            6.5    5.1   )-----------( 120      ( 03 Jul 2019 16:47 )             20.9     Imaging:

## 2019-07-04 NOTE — CONSULT NOTE ADULT - ASSESSMENT
Impression:  1) Anemia without overt bleeding- Pt with EGD 08/2018 revealing portal hypertensive gastropathy (likely source) as well as small esophageal varices. Other differential diagnosis includes PUD, erosive gastropathy/esophagitis, angiectasia, Dieulafoy's lesion    2) Congenital liver disease with portal vein thrombosis c/b portal HTN     Recommendations:  -Spoke to father, mother (on the phone) as well as patient extensively; Given anemia, would benefit from EGD. They would like to proceed with an EGD first (as it is most likely upper GI source) before proceeding with a colonoscopy +/- capsule study if EGD is negative.  -Agree with PPI and octreotide gtt  -Monitor CBC, transfuse to Hgb of 7  -Rest of care per primary team

## 2019-07-05 ENCOUNTER — RESULT REVIEW (OUTPATIENT)
Age: 24
End: 2019-07-05

## 2019-07-05 LAB
ALBUMIN SERPL ELPH-MCNC: 4.2 G/DL — SIGNIFICANT CHANGE UP (ref 3.3–5)
ALP SERPL-CCNC: 63 U/L — SIGNIFICANT CHANGE UP (ref 40–120)
ALT FLD-CCNC: 9 U/L — LOW (ref 10–45)
ANION GAP SERPL CALC-SCNC: 12 MMOL/L — SIGNIFICANT CHANGE UP (ref 5–17)
AST SERPL-CCNC: 13 U/L — SIGNIFICANT CHANGE UP (ref 10–40)
BILIRUB SERPL-MCNC: 1.4 MG/DL — HIGH (ref 0.2–1.2)
BUN SERPL-MCNC: 7 MG/DL — SIGNIFICANT CHANGE UP (ref 7–23)
CALCIUM SERPL-MCNC: 8.7 MG/DL — SIGNIFICANT CHANGE UP (ref 8.4–10.5)
CHLORIDE SERPL-SCNC: 103 MMOL/L — SIGNIFICANT CHANGE UP (ref 96–108)
CO2 SERPL-SCNC: 23 MMOL/L — SIGNIFICANT CHANGE UP (ref 22–31)
CREAT SERPL-MCNC: 0.77 MG/DL — SIGNIFICANT CHANGE UP (ref 0.5–1.3)
GLUCOSE SERPL-MCNC: 103 MG/DL — HIGH (ref 70–99)
HCT VFR BLD CALC: 25.9 % — LOW (ref 34.5–45)
HCT VFR BLD CALC: 26.3 % — LOW (ref 34.5–45)
HGB BLD-MCNC: 7.7 G/DL — LOW (ref 11.5–15.5)
HGB BLD-MCNC: 8.3 G/DL — LOW (ref 11.5–15.5)
MCHC RBC-ENTMCNC: 25.6 PG — LOW (ref 27–34)
MCHC RBC-ENTMCNC: 26.7 PG — LOW (ref 27–34)
MCHC RBC-ENTMCNC: 29.7 GM/DL — LOW (ref 32–36)
MCHC RBC-ENTMCNC: 31.6 GM/DL — LOW (ref 32–36)
MCV RBC AUTO: 84.6 FL — SIGNIFICANT CHANGE UP (ref 80–100)
MCV RBC AUTO: 86 FL — SIGNIFICANT CHANGE UP (ref 80–100)
PLATELET # BLD AUTO: 109 K/UL — LOW (ref 150–400)
PLATELET # BLD AUTO: 118 K/UL — LOW (ref 150–400)
POTASSIUM SERPL-MCNC: 3.7 MMOL/L — SIGNIFICANT CHANGE UP (ref 3.5–5.3)
POTASSIUM SERPL-SCNC: 3.7 MMOL/L — SIGNIFICANT CHANGE UP (ref 3.5–5.3)
PROT SERPL-MCNC: 6.4 G/DL — SIGNIFICANT CHANGE UP (ref 6–8.3)
RBC # BLD: 3.01 M/UL — LOW (ref 3.8–5.2)
RBC # BLD: 3.11 M/UL — LOW (ref 3.8–5.2)
RBC # FLD: 17.9 % — HIGH (ref 10.3–14.5)
RBC # FLD: 18.5 % — HIGH (ref 10.3–14.5)
SODIUM SERPL-SCNC: 138 MMOL/L — SIGNIFICANT CHANGE UP (ref 135–145)
WBC # BLD: 4.2 K/UL — SIGNIFICANT CHANGE UP (ref 3.8–10.5)
WBC # BLD: 4.64 K/UL — SIGNIFICANT CHANGE UP (ref 3.8–10.5)
WBC # FLD AUTO: 4.2 K/UL — SIGNIFICANT CHANGE UP (ref 3.8–10.5)
WBC # FLD AUTO: 4.64 K/UL — SIGNIFICANT CHANGE UP (ref 3.8–10.5)

## 2019-07-05 PROCEDURE — 88312 SPECIAL STAINS GROUP 1: CPT | Mod: 26

## 2019-07-05 PROCEDURE — 99233 SBSQ HOSP IP/OBS HIGH 50: CPT | Mod: GC

## 2019-07-05 PROCEDURE — 88305 TISSUE EXAM BY PATHOLOGIST: CPT | Mod: 26

## 2019-07-05 PROCEDURE — 43239 EGD BIOPSY SINGLE/MULTIPLE: CPT | Mod: GC

## 2019-07-05 RX ORDER — PANTOPRAZOLE SODIUM 20 MG/1
20 TABLET, DELAYED RELEASE ORAL
Refills: 0 | Status: DISCONTINUED | OUTPATIENT
Start: 2019-07-05 | End: 2019-07-06

## 2019-07-05 RX ORDER — DIPHENHYDRAMINE HCL 50 MG
1 CAPSULE ORAL
Qty: 0 | Refills: 0 | DISCHARGE

## 2019-07-05 RX ORDER — BUDESONIDE AND FORMOTEROL FUMARATE DIHYDRATE 160; 4.5 UG/1; UG/1
0 AEROSOL RESPIRATORY (INHALATION)
Qty: 0 | Refills: 0 | DISCHARGE

## 2019-07-05 RX ORDER — ACETAMINOPHEN 500 MG
325 TABLET ORAL ONCE
Refills: 0 | Status: COMPLETED | OUTPATIENT
Start: 2019-07-05 | End: 2019-07-05

## 2019-07-05 RX ADMIN — ONDANSETRON 4 MILLIGRAM(S): 8 TABLET, FILM COATED ORAL at 20:37

## 2019-07-05 RX ADMIN — Medication 650 MILLIGRAM(S): at 11:04

## 2019-07-05 RX ADMIN — PANTOPRAZOLE SODIUM 40 MILLIGRAM(S): 20 TABLET, DELAYED RELEASE ORAL at 05:00

## 2019-07-05 RX ADMIN — Medication 0.5 MILLIGRAM(S): at 14:14

## 2019-07-05 RX ADMIN — ESCITALOPRAM OXALATE 40 MILLIGRAM(S): 10 TABLET, FILM COATED ORAL at 14:14

## 2019-07-05 RX ADMIN — Medication 650 MILLIGRAM(S): at 21:07

## 2019-07-05 RX ADMIN — Medication 325 MILLIGRAM(S): at 23:35

## 2019-07-05 RX ADMIN — Medication 650 MILLIGRAM(S): at 11:34

## 2019-07-05 RX ADMIN — Medication 1 MILLIGRAM(S): at 19:54

## 2019-07-05 RX ADMIN — Medication 650 MILLIGRAM(S): at 20:37

## 2019-07-05 NOTE — PROGRESS NOTE ADULT - ATTENDING COMMENTS
Patient with congenital syndrome has had long standing portal vein thrombosis.  Had endoscopy today showing portal hypertensive gastropathy without significant esophageal varices.  Pursue anemia evaluation assess abdominal imaging to assess for ascites and liver mass.

## 2019-07-05 NOTE — CHART NOTE - NSCHARTNOTEFT_GEN_A_CORE
Spoke with Dr. Melchor at 1640 regarding am cbc results and hepatology recs. As per Dr. Melchor CBC to be repeated. Repeat CBC results 8.3/26.3.  Spoke with Dr. Melchor who wants patient to be monitored overnight, f/u CBC in am. Pt and Pt's mother does not want repeat abdomen imaging as per hepatology. Will continue to monitor.     Kwabena Zhang NP  [id number]

## 2019-07-06 ENCOUNTER — TRANSCRIPTION ENCOUNTER (OUTPATIENT)
Age: 24
End: 2019-07-06

## 2019-07-06 VITALS
TEMPERATURE: 99 F | DIASTOLIC BLOOD PRESSURE: 82 MMHG | RESPIRATION RATE: 17 BRPM | OXYGEN SATURATION: 96 % | HEART RATE: 56 BPM | SYSTOLIC BLOOD PRESSURE: 144 MMHG

## 2019-07-06 LAB
ANION GAP SERPL CALC-SCNC: 12 MMOL/L — SIGNIFICANT CHANGE UP (ref 5–17)
BUN SERPL-MCNC: 11 MG/DL — SIGNIFICANT CHANGE UP (ref 7–23)
CALCIUM SERPL-MCNC: 8.4 MG/DL — SIGNIFICANT CHANGE UP (ref 8.4–10.5)
CHLORIDE SERPL-SCNC: 105 MMOL/L — SIGNIFICANT CHANGE UP (ref 96–108)
CO2 SERPL-SCNC: 24 MMOL/L — SIGNIFICANT CHANGE UP (ref 22–31)
CREAT SERPL-MCNC: 0.74 MG/DL — SIGNIFICANT CHANGE UP (ref 0.5–1.3)
GLUCOSE SERPL-MCNC: 101 MG/DL — HIGH (ref 70–99)
HCT VFR BLD CALC: 24.7 % — LOW (ref 34.5–45)
HCT VFR BLD CALC: 26.5 % — LOW (ref 34.5–45)
HGB BLD-MCNC: 8 G/DL — LOW (ref 11.5–15.5)
HGB BLD-MCNC: 8.4 G/DL — LOW (ref 11.5–15.5)
MCHC RBC-ENTMCNC: 27 PG — SIGNIFICANT CHANGE UP (ref 27–34)
MCHC RBC-ENTMCNC: 27.1 PG — SIGNIFICANT CHANGE UP (ref 27–34)
MCHC RBC-ENTMCNC: 31.9 GM/DL — LOW (ref 32–36)
MCHC RBC-ENTMCNC: 32.4 GM/DL — SIGNIFICANT CHANGE UP (ref 32–36)
MCV RBC AUTO: 83.4 FL — SIGNIFICANT CHANGE UP (ref 80–100)
MCV RBC AUTO: 85 FL — SIGNIFICANT CHANGE UP (ref 80–100)
PLATELET # BLD AUTO: 107 K/UL — LOW (ref 150–400)
PLATELET # BLD AUTO: 125 K/UL — LOW (ref 150–400)
POTASSIUM SERPL-MCNC: 3.5 MMOL/L — SIGNIFICANT CHANGE UP (ref 3.5–5.3)
POTASSIUM SERPL-SCNC: 3.5 MMOL/L — SIGNIFICANT CHANGE UP (ref 3.5–5.3)
RBC # BLD: 2.96 M/UL — LOW (ref 3.8–5.2)
RBC # BLD: 3.12 M/UL — LOW (ref 3.8–5.2)
RBC # FLD: 18.5 % — HIGH (ref 10.3–14.5)
RBC # FLD: 18.9 % — HIGH (ref 10.3–14.5)
SODIUM SERPL-SCNC: 141 MMOL/L — SIGNIFICANT CHANGE UP (ref 135–145)
WBC # BLD: 3.5 K/UL — LOW (ref 3.8–10.5)
WBC # BLD: 4.4 K/UL — SIGNIFICANT CHANGE UP (ref 3.8–10.5)
WBC # FLD AUTO: 3.5 K/UL — LOW (ref 3.8–10.5)
WBC # FLD AUTO: 4.4 K/UL — SIGNIFICANT CHANGE UP (ref 3.8–10.5)

## 2019-07-06 PROCEDURE — 85027 COMPLETE CBC AUTOMATED: CPT

## 2019-07-06 PROCEDURE — 36430 TRANSFUSION BLD/BLD COMPNT: CPT

## 2019-07-06 PROCEDURE — 80048 BASIC METABOLIC PNL TOTAL CA: CPT

## 2019-07-06 PROCEDURE — 88305 TISSUE EXAM BY PATHOLOGIST: CPT

## 2019-07-06 PROCEDURE — 86901 BLOOD TYPING SEROLOGIC RH(D): CPT

## 2019-07-06 PROCEDURE — 86900 BLOOD TYPING SEROLOGIC ABO: CPT

## 2019-07-06 PROCEDURE — 86902 BLOOD TYPE ANTIGEN DONOR EA: CPT

## 2019-07-06 PROCEDURE — 86850 RBC ANTIBODY SCREEN: CPT

## 2019-07-06 PROCEDURE — 85610 PROTHROMBIN TIME: CPT

## 2019-07-06 PROCEDURE — 85730 THROMBOPLASTIN TIME PARTIAL: CPT

## 2019-07-06 PROCEDURE — 82272 OCCULT BLD FECES 1-3 TESTS: CPT

## 2019-07-06 PROCEDURE — 99285 EMERGENCY DEPT VISIT HI MDM: CPT | Mod: 25

## 2019-07-06 PROCEDURE — 80053 COMPREHEN METABOLIC PANEL: CPT

## 2019-07-06 PROCEDURE — 86922 COMPATIBILITY TEST ANTIGLOB: CPT

## 2019-07-06 PROCEDURE — P9040: CPT

## 2019-07-06 PROCEDURE — 96374 THER/PROPH/DIAG INJ IV PUSH: CPT

## 2019-07-06 PROCEDURE — 88312 SPECIAL STAINS GROUP 1: CPT

## 2019-07-06 RX ORDER — ESCITALOPRAM OXALATE 10 MG/1
2 TABLET, FILM COATED ORAL
Qty: 0 | Refills: 0 | DISCHARGE

## 2019-07-06 RX ORDER — PANTOPRAZOLE SODIUM 20 MG/1
1 TABLET, DELAYED RELEASE ORAL
Qty: 30 | Refills: 0
Start: 2019-07-06 | End: 2019-08-04

## 2019-07-06 RX ADMIN — Medication 650 MILLIGRAM(S): at 06:29

## 2019-07-06 RX ADMIN — PANTOPRAZOLE SODIUM 20 MILLIGRAM(S): 20 TABLET, DELAYED RELEASE ORAL at 06:30

## 2019-07-06 RX ADMIN — ESCITALOPRAM OXALATE 40 MILLIGRAM(S): 10 TABLET, FILM COATED ORAL at 11:10

## 2019-07-06 RX ADMIN — Medication 0.5 MILLIGRAM(S): at 11:10

## 2019-07-06 RX ADMIN — Medication 325 MILLIGRAM(S): at 00:05

## 2019-07-06 RX ADMIN — Medication 650 MILLIGRAM(S): at 06:58

## 2019-07-06 NOTE — DISCHARGE NOTE PROVIDER - CARE PROVIDER_API CALL
Diaz Zelaya)  Pediatrics  [address] [place] [id number]  Phone: [phone]  Fax: [phone]  Follow Up Time: 1 week    Jared Hidalgo)  Gastroenterology; Internal Medicine; Transplant Hepatology  [address] [place] [address]  Phone: [phone]  Fax: [phone]  Follow Up Time: 1 week    Valentin Andrade)  Gastroenterology; Internal Medicine  [address] [place] [address]  Phone: [phone]  Fax: [phone]  Follow Up Time: 1 week

## 2019-07-06 NOTE — PROGRESS NOTE ADULT - SUBJECTIVE AND OBJECTIVE BOX
Chief Complaint:  Patient is a 24y old  Female who presents with a chief complaint of abnormal labs (05 Jul 2019 08:53)      Interval Events: No events overnight.  Was transfused to Hgb 9, no active bleeding.  FOBT negative.  Anemia workup not performed prior to transfusion.     Allergies:  No Known Allergies      Hospital Medications:  acetaminophen   Tablet .. 650 milliGRAM(s) Oral every 6 hours PRN  ALPRAZolam 0.5 milliGRAM(s) Oral daily  ALPRAZolam 1 milliGRAM(s) Oral two times a day PRN  escitalopram 40 milliGRAM(s) Oral daily  octreotide  Infusion 50 MICROgram(s)/Hr IV Continuous <Continuous>  ondansetron Injectable 4 milliGRAM(s) IV Push once  pantoprazole  Injectable 40 milliGRAM(s) IV Push two times a day      PMHX/PSHX:  Iron deficiency anemia  ADHD (attention deficit hyperactivity disorder)  Polycystic ovarian syndrome  Duplicate cervix  Vaginal septum  Branchio-elizabeth-renal syndrome  Esophageal varices  Portal vein thrombosis  Thrombocytopenia  Depression  Anxiety  ASD (atrial septal defect)  Hypersplenism  Portal hypertension  Cataract  Hernia  Megaureter  Varices, esophageal  ASD (atrial septal defect)      Family history:  No pertinent family history in first degree relatives      ROS:     General:  No wt loss, fevers, chills, night sweats, fatigue,   Eyes:  Good vision, no reported pain  ENT:  No sore throat, pain, runny nose, dysphagia  CV:  No pain, palpitations, hypo/hypertension  Resp:  No dyspnea, cough, tachypnea, wheezing  GI:  See HPI  :  No pain, bleeding, incontinence, nocturia  Muscle:  No pain, weakness  Neuro:  No weakness, tingling, memory problems  Psych:  No fatigue, insomnia, mood problems, depression  Endocrine:  No polyuria, polydipsia, cold/heat intolerance  Heme:  No petechiae, ecchymosis, easy bruisability  Skin:  No rash, edema      PHYSICAL EXAM:     GENERAL: NAD  HEENT:  NC/AT  CHEST:  Full & symmetric excursion, no increased effort  ABDOMEN:  Soft, non-tender, non-distended, +BS  EXTREMITIES:  no edema  SKIN:  No rash  NEURO:  Alert      Vital Signs:  Vital Signs Last 24 Hrs  T(C): 36.9 (05 Jul 2019 04:35), Max: 36.9 (05 Jul 2019 04:35)  T(F): 98.4 (05 Jul 2019 04:35), Max: 98.4 (05 Jul 2019 04:35)  HR: 74 (05 Jul 2019 04:35) (72 - 91)  BP: 110/67 (05 Jul 2019 04:35) (105/68 - 112/72)  BP(mean): --  RR: 18 (05 Jul 2019 04:35) (18 - 18)  SpO2: 100% (05 Jul 2019 04:35) (100% - 100%)  Daily     Daily     LABS:                        7.7    4.64  )-----------( 118      ( 05 Jul 2019 09:04 )             25.9     07-05    138  |  103  |  7   ----------------------------<  103<H>  3.7   |  23  |  0.77    Ca    8.7      05 Jul 2019 06:33    TPro  6.4  /  Alb  4.2  /  TBili  1.4<H>  /  DBili  x   /  AST  13  /  ALT  9<L>  /  AlkPhos  63  07-05    LIVER FUNCTIONS - ( 05 Jul 2019 06:33 )  Alb: 4.2 g/dL / Pro: 6.4 g/dL / ALK PHOS: 63 U/L / ALT: 9 U/L / AST: 13 U/L / GGT: x           PT/INR - ( 03 Jul 2019 16:47 )   PT: 13.7 sec;   INR: 1.20 ratio         PTT - ( 03 Jul 2019 16:47 )  PTT:28.5 sec        Imaging:  No imaging
INTERVAL HPI/OVERNIGHT EVENTS: I feel better . Father in room.   Vital Signs Last 24 Hrs  T(C): 36.5 (04 Jul 2019 11:51), Max: 37.1 (03 Jul 2019 19:55)  T(F): 97.7 (04 Jul 2019 11:51), Max: 98.8 (03 Jul 2019 19:55)  HR: 72 (04 Jul 2019 11:51) (72 - 98)  BP: 105/68 (04 Jul 2019 11:51) (102/63 - 125/68)  BP(mean): 87 (03 Jul 2019 20:31) (87 - 87)  RR: 18 (04 Jul 2019 11:51) (16 - 18)  SpO2: 100% (04 Jul 2019 11:51) (99% - 100%)  I&O's Summary    MEDICATIONS  (STANDING):  ALPRAZolam 0.5 milliGRAM(s) Oral daily  escitalopram 40 milliGRAM(s) Oral daily  octreotide  Infusion 50 MICROgram(s)/Hr (10 mL/Hr) IV Continuous <Continuous>  ondansetron Injectable 4 milliGRAM(s) IV Push once  pantoprazole  Injectable 40 milliGRAM(s) IV Push two times a day    MEDICATIONS  (PRN):  acetaminophen   Tablet .. 650 milliGRAM(s) Oral every 6 hours PRN Moderate Pain (4 - 6)  ALPRAZolam 1 milliGRAM(s) Oral two times a day PRN anxiety    LABS:                        9.5    5.4   )-----------( 135      ( 04 Jul 2019 06:37 )             30.0     07-04    139  |  106  |  6<L>  ----------------------------<  87  4.4   |  21<L>  |  0.70    Ca    9.2      04 Jul 2019 06:37    TPro  6.5  /  Alb  4.1  /  TBili  1.6<H>  /  DBili  x   /  AST  13  /  ALT  10  /  AlkPhos  69  07-04    PT/INR - ( 03 Jul 2019 16:47 )   PT: 13.7 sec;   INR: 1.20 ratio         PTT - ( 03 Jul 2019 16:47 )  PTT:28.5 sec    CAPILLARY BLOOD GLUCOSE              REVIEW OF SYSTEMS:  CONSTITUTIONAL: No fever, weight loss, or fatigue  EYES: No eye pain, visual disturbances, or discharge  ENMT:  No difficulty hearing, tinnitus, vertigo; No sinus or throat pain  RESPIRATORY: No cough, wheezing, chills or hemoptysis; No shortness of breath  CARDIOVASCULAR: No chest pain, palpitations, dizziness, or leg swelling  GASTROINTESTINAL: No abdominal or epigastric pain. No nausea, vomiting, or hematemesis; No diarrhea or constipation. No melena or hematochezia.  GENITOURINARY: No dysuria, frequency, hematuria, or incontinence  NEUROLOGICAL: No headaches, memory loss, loss of strength, numbness, or tremors    RADIOLOGY & ADDITIONAL TESTS:    Consultant(s) Notes Reviewed:  [x ] YES  [ ] NO    PHYSICAL EXAM:  GENERAL: NAD, well-groomed, well-developed,not in any distress ,  HEAD:  Atraumatic, Normocephalic  EYES: EOMI, PERRLA, conjunctiva and sclera clear  ENMT: No tonsillar erythema, exudates, or enlargement; Moist mucous membranes, Good dentition, No lesions  NECK: Supple, No JVD, Normal thyroid  NERVOUS SYSTEM:  Alert & Oriented X3, No focal deficit   CHEST/LUNG: Good air entry bilateral with no  rales, rhonchi, wheezing, or rubs  HEART: Regular rate and rhythm; No murmurs, rubs, or gallops  ABDOMEN: Soft, Nontender, Nondistended; Bowel sounds present  EXTREMITIES:  2+ Peripheral Pulses, No clubbing, cyanosis, or edema  SKIN: No rashes or lesions    Care Discussed with Consultants/Other Providers [ x] YES  [ ] NO
INTERVAL HPI/OVERNIGHT EVENTS: I feel fine.   Vital Signs Last 24 Hrs  T(C): 36.9 (05 Jul 2019 04:35), Max: 36.9 (05 Jul 2019 04:35)  T(F): 98.4 (05 Jul 2019 04:35), Max: 98.4 (05 Jul 2019 04:35)  HR: 74 (05 Jul 2019 04:35) (72 - 91)  BP: 110/67 (05 Jul 2019 04:35) (105/68 - 112/72)  BP(mean): --  RR: 18 (05 Jul 2019 04:35) (18 - 18)  SpO2: 100% (05 Jul 2019 04:35) (100% - 100%)  I&O's Summary    04 Jul 2019 07:01  -  05 Jul 2019 07:00  --------------------------------------------------------  IN: 240 mL / OUT: 0 mL / NET: 240 mL      MEDICATIONS  (STANDING):  ALPRAZolam 0.5 milliGRAM(s) Oral daily  escitalopram 40 milliGRAM(s) Oral daily  octreotide  Infusion 50 MICROgram(s)/Hr (10 mL/Hr) IV Continuous <Continuous>  ondansetron Injectable 4 milliGRAM(s) IV Push once  pantoprazole  Injectable 40 milliGRAM(s) IV Push two times a day    MEDICATIONS  (PRN):  acetaminophen   Tablet .. 650 milliGRAM(s) Oral every 6 hours PRN Moderate Pain (4 - 6)  ALPRAZolam 1 milliGRAM(s) Oral two times a day PRN anxiety    LABS:                        9.5    5.4   )-----------( 135      ( 04 Jul 2019 06:37 )             30.0     07-05    138  |  103  |  7   ----------------------------<  103<H>  3.7   |  23  |  0.77    Ca    8.7      05 Jul 2019 06:33    TPro  6.4  /  Alb  4.2  /  TBili  1.4<H>  /  DBili  x   /  AST  13  /  ALT  9<L>  /  AlkPhos  63  07-05    PT/INR - ( 03 Jul 2019 16:47 )   PT: 13.7 sec;   INR: 1.20 ratio         PTT - ( 03 Jul 2019 16:47 )  PTT:28.5 sec    CAPILLARY BLOOD GLUCOSE              REVIEW OF SYSTEMS:  CONSTITUTIONAL: No fever, weight loss, or fatigue  EYES: No eye pain, visual disturbances, or discharge  ENMT:  No difficulty hearing, tinnitus, vertigo; No sinus or throat pain  NECK: No pain or stiffness  RESPIRATORY: No cough, wheezing, chills or hemoptysis; No shortness of breath  CARDIOVASCULAR: No chest pain, palpitations, dizziness, or leg swelling  GASTROINTESTINAL: No abdominal or epigastric pain. No nausea, vomiting, or hematemesis; No diarrhea or constipation. No melena or hematochezia.  GENITOURINARY: No dysuria, frequency, hematuria, or incontinence  NEUROLOGICAL: No headaches, memory loss, loss of strength, numbness, or tremors      Consultant(s) Notes Reviewed:  [x ] YES  [ ] NO    PHYSICAL EXAM:  GENERAL: NAD, well-groomed, well-developed,not in any distress ,  HEAD:  Atraumatic, Normocephalic  EYES: EOMI, PERRLA, conjunctiva and sclera clear  ENMT: No tonsillar erythema, exudates, or enlargement; Moist mucous membranes, Good dentition, No lesions  NECK: Supple, No JVD, Normal thyroid  NERVOUS SYSTEM:  Alert & Oriented X3, No focal deficit   CHEST/LUNG: Good air entry bilateral with no  rales, rhonchi, wheezing, or rubs  HEART: Regular rate and rhythm; No murmurs, rubs, or gallops  ABDOMEN: Soft, Nontender, Nondistended; Bowel sounds present  EXTREMITIES:  2+ Peripheral Pulses, No clubbing, cyanosis, or edema    Care Discussed with Consultants/Other Providers [ x] YES  [ ] NO
INTERVAL HPI/OVERNIGHT EVENTS: I feel fine . No BM yet . Father in room.   Vital Signs Last 24 Hrs  T(C): 36.6 (06 Jul 2019 09:26), Max: 36.8 (05 Jul 2019 17:20)  T(F): 97.9 (06 Jul 2019 09:26), Max: 98.3 (05 Jul 2019 18:05)  HR: 65 (06 Jul 2019 09:26) (65 - 94)  BP: 96/59 (06 Jul 2019 09:26) (94/58 - 119/77)  BP(mean): --  RR: 18 (06 Jul 2019 09:26) (16 - 18)  SpO2: 98% (06 Jul 2019 09:26) (98% - 100%)  I&O's Summary    05 Jul 2019 07:01  -  06 Jul 2019 07:00  --------------------------------------------------------  IN: 960 mL / OUT: 200 mL / NET: 760 mL      MEDICATIONS  (STANDING):  ALPRAZolam 0.5 milliGRAM(s) Oral daily  escitalopram 40 milliGRAM(s) Oral daily  pantoprazole    Tablet 20 milliGRAM(s) Oral before breakfast    MEDICATIONS  (PRN):  acetaminophen   Tablet .. 650 milliGRAM(s) Oral every 6 hours PRN Moderate Pain (4 - 6)  ALPRAZolam 1 milliGRAM(s) Oral two times a day PRN anxiety    LABS:                        8.0    4.4   )-----------( 125      ( 06 Jul 2019 07:13 )             24.7     07-06    141  |  105  |  11  ----------------------------<  101<H>  3.5   |  24  |  0.74    Ca    8.4      06 Jul 2019 07:13    TPro  6.4  /  Alb  4.2  /  TBili  1.4<H>  /  DBili  x   /  AST  13  /  ALT  9<L>  /  AlkPhos  63  07-05        CAPILLARY BLOOD GLUCOSE              REVIEW OF SYSTEMS:  CONSTITUTIONAL: No fever, weight loss, or fatigue  EYES: No eye pain, visual disturbances, or discharge  ENMT:  No difficulty hearing, tinnitus, vertigo; No sinus or throat pain  NECK: No pain or stiffness  RESPIRATORY: No cough, wheezing, chills or hemoptysis; No shortness of breath  CARDIOVASCULAR: No chest pain, palpitations, dizziness, or leg swelling  GASTROINTESTINAL: No abdominal or epigastric pain. No nausea, vomiting, or hematemesis; No diarrhea or constipation. No melena or hematochezia.  GENITOURINARY: No dysuria, frequency, hematuria, or incontinence  NEUROLOGICAL: No headaches, memory loss, loss of strength, numbness, or tremors      Consultant(s) Notes Reviewed:  [x ] YES  [ ] NO    PHYSICAL EXAM:  GENERAL: NAD, well-groomed, well-developed,not in any distress ,  HEAD:  Atraumatic, Normocephalic  EYES: EOMI, PERRLA, conjunctiva and sclera clear  ENMT: No tonsillar erythema, exudates, or enlargement; Moist mucous membranes, Good dentition, No lesions  NECK: Supple, No JVD, Normal thyroid  NERVOUS SYSTEM:  Alert & Oriented X3, No focal deficit   CHEST/LUNG: Good air entry bilateral with no  rales, rhonchi, wheezing, or rubs  HEART: Regular rate and rhythm; No murmurs, rubs, or gallops  ABDOMEN: Soft, Nontender, Nondistended; Bowel sounds present  EXTREMITIES:  2+ Peripheral Pulses, No clubbing, cyanosis, or edema  SKIN: No rashes or lesions    Care Discussed with Consultants/Other Providers [ x] YES  [ ] NO

## 2019-07-06 NOTE — DISCHARGE NOTE PROVIDER - PROVIDER TOKENS
PROVIDER:[TOKEN:[[id number]],FOLLOWUP:[1 week]],PROVIDER:[TOKEN:[[id number]],FOLLOWUP:[1 week]],PROVIDER:[TOKEN:[[id number]],FOLLOWUP:[1 week]]

## 2019-07-06 NOTE — DISCHARGE NOTE PROVIDER - NSDCCPCAREPLAN_GEN_ALL_CORE_FT
PRINCIPAL DISCHARGE DIAGNOSIS  Diagnosis: Anemia  Assessment and Plan of Treatment: Follow-up with GI outpatient for colonoscopy and your PCP for routine hemoglobin monitoring  Both hemoglobin (Hgb) and hematocrit values are used to define anemia. These lab values are obtained from a complete blood count (CBC) test. This is performed at a caregiver's office  Iron is an essential part of hemoglobin. Without enough hemoglobin, anemia develops and the body does not get the right amount of oxygen. Iron deficiency anemia develops after the body has had a low level of iron for a long time. This is either caused by blood loss, not taking in or absorbing enough iron, or increased demands for iron (like pregnancy or rapid growth).   Foods from animal origin such as beef, chicken, and pork, are good sources of iron. Be sure to have one of these foods at each meal. Vitamin C helps your body absorb iron. Foods rich in Vitamin C include citrus, bell pepper, strawberries, spinach and cantaloupe. In some cases, iron supplements may be needed in order to correct the iron deficiency. In the case of poor absorption, extra iron may have to be given directly into the vein through a needle (intravenously)      SECONDARY DISCHARGE DIAGNOSES  Diagnosis: Depression  Assessment and Plan of Treatment: Continue current medications as directed    Diagnosis: Esophageal varices  Assessment and Plan of Treatment: Follow-up with Hepatology outpatient in 1-2 weeks

## 2019-07-06 NOTE — DISCHARGE NOTE NURSING/CASE MANAGEMENT/SOCIAL WORK - NSDCDPATPORTLINK_GEN_ALL_CORE
You can access the [facility] Portal, offered by [facility], by registering with the following website: [email or web address]

## 2019-07-06 NOTE — DISCHARGE NOTE PROVIDER - CARE PROVIDERS DIRECT ADDRESSES
,DirectAddress_Unknown,[email or web address]

## 2019-07-06 NOTE — DISCHARGE NOTE PROVIDER - HOSPITAL COURSE
24 year old F with pmhx of iron deficiency anemia, ASD (atrial septal defect) Branchio-elizabeth-renal syndrome, thrombocytopenia, Hypersplenism  , PCOS, and Esophageal varices with pshx of hernia repair, ASD (atrial septal defect)  s/p repair  presents to ED with abnormal lab result (5.5 Hb). +nausea and NBNB vomiting yesterday, one red and one dark, with dark stools in the setting of iron use PO. During hospital course, FOBT negative, pt started on PPI and octreotide gtt, received 2 units PRBC, seen by GI, underwent an EGD, found portal hypertensive gastropathy without significant esophageal varices. Pt with stable H/H with no further episodes of bleeding, plan to defer colonoscopy as outpatient with GI, and routine follow-up with PCP in one week.

## 2019-07-06 NOTE — PROGRESS NOTE ADULT - ASSESSMENT
24 year old F with pmhx of iron deficiency anemia, ASD (atrial septal defect) Branchio-elizabeth-renal syndrome, thrombocytopenia, Hypersplenism  , PCOS, and Esophageal varices with pshx of hernia repair, ASD (atrial septal defect)  s/p repair  presents to ED with abnormal lab result (5.5 Hb). +nausea and NBNB vomiting yesterday, one red and one dark. Reports black stools, but believes it might be due to iron supplements Went to see her PMD today where they performed bloodwork and found pt to be anemic, and referred to ED for transfusion. Denies bruising, weakness, lightheadedness, dysuria, hematuria, CP, and SOB. Not currently menstruating.  Hx of GI bleed and low Hb, has had transfusions in the past. Denies smoking, drinking, or drug use. PCP: Dr. Diaz Zelaya     Problem/Plan - 1:  ·  Problem: GI bleed.  Plan: Hemodynamically stable . PPI and Octreotide . GI consult noted.  Hgb better.      Problem/Plan - 2:  ·  Problem: Anemia due to blood loss.  Plan: Acute on chronic blood loss . Transfusing 2 units of PRBC . CBC daily .       Problem/Plan - 3:  ·  Problem: Anxiety.  Plan: Lexapro and Xanax .      Problem/Plan - 4:  ·  Problem: Depression.  Plan: Lexapro.      Problem/Plan - 5:  ·  Problem: Esophageal varices.  Plan: H/O Banding.
Impression:  1) Anemia without overt bleeding- Pt with EGD 08/2018 revealing portal hypertensive gastropathy (likely source) as well as small esophageal varices. Other differential diagnosis includes PUD, erosive gastropathy/esophagitis, angiectasia, Dieulafoy's lesion  2) Congenital liver disease with portal vein thrombosis c/b portal HTN   3) Branchio-elizabeth-renal syndrome    Recommendations:  - if possible, please perform anemia evaluation on pre-tranfused blood (iron studies/ferritin, retic count, epo level, ?hgb electrophoresis) as unclear that patient is having GI bleeding  - will plan for EGD today for evaluation  - if negative, should have colon+/- capsule as outpatient   - continue PPI and octreotide gtt  for now  - trend CBC, CMP, INR
24 year old F with pmhx of iron deficiency anemia, ASD (atrial septal defect) Branchio-elizabeth-renal syndrome, thrombocytopenia, Hypersplenism  , PCOS, and Esophageal varices with pshx of hernia repair, ASD (atrial septal defect)  s/p repair  presents to ED with abnormal lab result (5.5 Hb). +nausea and NBNB vomiting yesterday, one red and one dark. Reports black stools, but believes it might be due to iron supplements Went to see her PMD today where they performed bloodwork and found pt to be anemic, and referred to ED for transfusion. Denies bruising, weakness, lightheadedness, dysuria, hematuria, CP, and SOB. Not currently menstruating.  Hx of GI bleed and low Hb, has had transfusions in the past. Denies smoking, drinking, or drug use. PCP: Dr. Diaz Zelaya     Problem/Plan - 1:  ·  Problem: GI bleed.  Plan: Hemodynamically stable . PPI and Octreotide . GI consult noted.  Hgb better. Awaiting EGD.      Problem/Plan - 2:  ·  Problem: Anemia due to blood loss.  Plan: Acute on chronic blood loss . Transfusing 2 units of PRBC . CBC daily .       Problem/Plan - 3:  ·  Problem: Anxiety.  Plan: Lexapro and Xanax .      Problem/Plan - 4:  ·  Problem: Depression.  Plan: Lexapro.      Problem/Plan - 5:  ·  Problem: Esophageal varices.  Plan: H/O Banding.
24 year old F with pmhx of iron deficiency anemia, ASD (atrial septal defect) Branchio-elizabeth-renal syndrome, thrombocytopenia, Hypersplenism  , PCOS, and Esophageal varices with pshx of hernia repair, ASD (atrial septal defect)  s/p repair  presents to ED with abnormal lab result (5.5 Hb). +nausea and NBNB vomiting yesterday, one red and one dark. Reports black stools, but believes it might be due to iron supplements Went to see her PMD today where they performed bloodwork and found pt to be anemic, and referred to ED for transfusion. Denies bruising, weakness, lightheadedness, dysuria, hematuria, CP, and SOB. Not currently menstruating.  Hx of GI bleed and low Hb, has had transfusions in the past. Denies smoking, drinking, or drug use. PCP: Dr. Diaz Zelaya     Problem/Plan - 1:  ·  Problem: GI bleed.  Plan: Hemodynamically stable . PPI and Octreotide . GI consult noted.  Hgb better. S/P EGD.   < from: Upper Endoscopy (07.05.19 @ 12:17) >  Findings:       Small scar from prior esophageal banding in the distal esophagus.       Diffuse moderately erythematous mucosa without bleeding was found in the cardia, in the        gastric fundus and in the gastric body.       One non-bleeding superficial gastric erosion with no stigmata of bleeding was found in the        prepyloric region of the stomach. The lesion was 2 mm in largest dimension. Biopsies were        taken with a cold forceps for histology. Estimated blood loss: none.       The examined duodenum was normal.                                                                                                        Impression:          - Small scar from prior esophageal banding in the distal esophagus.                       - Erythematous mucosa in the cardia, gastric fundus and gastric body.                       - Non-bleeding gastric erosion with no stigmata of bleeding. Biopsied.                       - Normal examined duodenum.  Recommendation:      - [facility] to [facility] for ongoing care.                       - Await pathology results.                       - Change pantoprazole to 20 mg PO daily.                       - Discontinue octreotide drip.                       - Followup with hepatology service.    < end of copied text >  Colonoscopy and capsule endoscopy as outpt.      Problem/Plan - 2:  ·  Problem: Anemia due to blood loss.  Plan: Acute on chronic blood loss . S/P 2 units of PRBC . Rpting cbc 2 PM if stable then dc hoem to folow up with GI outpt.        Problem/Plan - 3:  ·  Problem: Anxiety.  Plan: Lexapro and Xanax .      Problem/Plan - 4:  ·  Problem: Depression.  Plan: Lexapro.      Problem/Plan - 5:  ·  Problem: Esophageal varices.  Plan: H/O Banding.     Disposition : D/W her parents and want to take her home. Said we follow outpt . If rpt cbc stable will dc home to follow outpt.

## 2019-07-08 LAB — SURGICAL PATHOLOGY STUDY: SIGNIFICANT CHANGE UP

## 2019-11-13 ENCOUNTER — INPATIENT (INPATIENT)
Facility: HOSPITAL | Age: 24
LOS: 0 days | Discharge: HOME | DRG: 369 | End: 2019-11-14
Attending: STUDENT IN AN ORGANIZED HEALTH CARE EDUCATION/TRAINING PROGRAM | Admitting: STUDENT IN AN ORGANIZED HEALTH CARE EDUCATION/TRAINING PROGRAM
Payer: COMMERCIAL

## 2019-11-13 VITALS
DIASTOLIC BLOOD PRESSURE: 71 MMHG | WEIGHT: 132.94 LBS | TEMPERATURE: 98 F | HEART RATE: 74 BPM | RESPIRATION RATE: 18 BRPM | HEIGHT: 59 IN | OXYGEN SATURATION: 100 % | SYSTOLIC BLOOD PRESSURE: 106 MMHG

## 2019-11-13 DIAGNOSIS — I85.01 ESOPHAGEAL VARICES WITH BLEEDING: ICD-10-CM

## 2019-11-13 LAB
ALBUMIN SERPL ELPH-MCNC: 4.4 G/DL — SIGNIFICANT CHANGE UP (ref 3.3–5)
ALP SERPL-CCNC: 64 U/L — SIGNIFICANT CHANGE UP (ref 40–120)
ALT FLD-CCNC: 14 U/L — SIGNIFICANT CHANGE UP (ref 10–45)
ANION GAP SERPL CALC-SCNC: 12 MMOL/L — SIGNIFICANT CHANGE UP (ref 5–17)
APTT BLD: 30 SEC — SIGNIFICANT CHANGE UP (ref 27.5–36.3)
AST SERPL-CCNC: 15 U/L — SIGNIFICANT CHANGE UP (ref 10–40)
BASOPHILS # BLD AUTO: 0.02 K/UL — SIGNIFICANT CHANGE UP (ref 0–0.2)
BASOPHILS NFR BLD AUTO: 0.5 % — SIGNIFICANT CHANGE UP (ref 0–2)
BILIRUB SERPL-MCNC: 0.8 MG/DL — SIGNIFICANT CHANGE UP (ref 0.2–1.2)
BLD GP AB SCN SERPL QL: NEGATIVE — SIGNIFICANT CHANGE UP
BUN SERPL-MCNC: 20 MG/DL — SIGNIFICANT CHANGE UP (ref 7–23)
CALCIUM SERPL-MCNC: 9 MG/DL — SIGNIFICANT CHANGE UP (ref 8.4–10.5)
CHLORIDE SERPL-SCNC: 101 MMOL/L — SIGNIFICANT CHANGE UP (ref 96–108)
CO2 SERPL-SCNC: 26 MMOL/L — SIGNIFICANT CHANGE UP (ref 22–31)
CREAT SERPL-MCNC: 0.67 MG/DL — SIGNIFICANT CHANGE UP (ref 0.5–1.3)
EOSINOPHIL # BLD AUTO: 0.06 K/UL — SIGNIFICANT CHANGE UP (ref 0–0.5)
EOSINOPHIL NFR BLD AUTO: 1.5 % — SIGNIFICANT CHANGE UP (ref 0–6)
GAS PNL BLDV: SIGNIFICANT CHANGE UP
GLUCOSE SERPL-MCNC: 104 MG/DL — HIGH (ref 70–99)
HCG SERPL-ACNC: 0.5 MIU/ML — SIGNIFICANT CHANGE UP
HCT VFR BLD CALC: 31.8 % — LOW (ref 34.5–45)
HGB BLD-MCNC: 9.9 G/DL — LOW (ref 11.5–15.5)
IMM GRANULOCYTES NFR BLD AUTO: 0.2 % — SIGNIFICANT CHANGE UP (ref 0–1.5)
INR BLD: 1.17 RATIO — HIGH (ref 0.88–1.16)
LYMPHOCYTES # BLD AUTO: 1.06 K/UL — SIGNIFICANT CHANGE UP (ref 1–3.3)
LYMPHOCYTES # BLD AUTO: 26.4 % — SIGNIFICANT CHANGE UP (ref 13–44)
MCHC RBC-ENTMCNC: 27 PG — SIGNIFICANT CHANGE UP (ref 27–34)
MCHC RBC-ENTMCNC: 31.1 GM/DL — LOW (ref 32–36)
MCV RBC AUTO: 86.6 FL — SIGNIFICANT CHANGE UP (ref 80–100)
MONOCYTES # BLD AUTO: 0.35 K/UL — SIGNIFICANT CHANGE UP (ref 0–0.9)
MONOCYTES NFR BLD AUTO: 8.7 % — SIGNIFICANT CHANGE UP (ref 2–14)
NEUTROPHILS # BLD AUTO: 2.52 K/UL — SIGNIFICANT CHANGE UP (ref 1.8–7.4)
NEUTROPHILS NFR BLD AUTO: 62.7 % — SIGNIFICANT CHANGE UP (ref 43–77)
NRBC # BLD: 0 /100 WBCS — SIGNIFICANT CHANGE UP (ref 0–0)
PLATELET # BLD AUTO: 95 K/UL — LOW (ref 150–400)
POTASSIUM SERPL-MCNC: 4.3 MMOL/L — SIGNIFICANT CHANGE UP (ref 3.5–5.3)
POTASSIUM SERPL-SCNC: 4.3 MMOL/L — SIGNIFICANT CHANGE UP (ref 3.5–5.3)
PROT SERPL-MCNC: 6.9 G/DL — SIGNIFICANT CHANGE UP (ref 6–8.3)
PROTHROM AB SERPL-ACNC: 13.4 SEC — HIGH (ref 10–12.9)
RBC # BLD: 3.67 M/UL — LOW (ref 3.8–5.2)
RBC # FLD: 13.8 % — SIGNIFICANT CHANGE UP (ref 10.3–14.5)
RH IG SCN BLD-IMP: POSITIVE — SIGNIFICANT CHANGE UP
SODIUM SERPL-SCNC: 139 MMOL/L — SIGNIFICANT CHANGE UP (ref 135–145)
WBC # BLD: 4.02 K/UL — SIGNIFICANT CHANGE UP (ref 3.8–10.5)
WBC # FLD AUTO: 4.02 K/UL — SIGNIFICANT CHANGE UP (ref 3.8–10.5)

## 2019-11-13 PROCEDURE — 99221 1ST HOSP IP/OBS SF/LOW 40: CPT

## 2019-11-13 PROCEDURE — 71045 X-RAY EXAM CHEST 1 VIEW: CPT | Mod: 26

## 2019-11-13 PROCEDURE — 99285 EMERGENCY DEPT VISIT HI MDM: CPT

## 2019-11-13 PROCEDURE — 93010 ELECTROCARDIOGRAM REPORT: CPT

## 2019-11-13 RX ORDER — PANTOPRAZOLE SODIUM 20 MG/1
8 TABLET, DELAYED RELEASE ORAL
Qty: 80 | Refills: 0 | Status: DISCONTINUED | OUTPATIENT
Start: 2019-11-13 | End: 2019-11-14

## 2019-11-13 RX ORDER — OCTREOTIDE ACETATE 200 UG/ML
50 INJECTION, SOLUTION INTRAVENOUS; SUBCUTANEOUS ONCE
Refills: 0 | Status: COMPLETED | OUTPATIENT
Start: 2019-11-13 | End: 2019-11-13

## 2019-11-13 RX ORDER — ALPRAZOLAM 0.25 MG
1 TABLET ORAL
Qty: 0 | Refills: 0 | DISCHARGE

## 2019-11-13 RX ORDER — ONDANSETRON 8 MG/1
4 TABLET, FILM COATED ORAL ONCE
Refills: 0 | Status: COMPLETED | OUTPATIENT
Start: 2019-11-13 | End: 2019-11-13

## 2019-11-13 RX ORDER — PANTOPRAZOLE SODIUM 20 MG/1
80 TABLET, DELAYED RELEASE ORAL ONCE
Refills: 0 | Status: COMPLETED | OUTPATIENT
Start: 2019-11-13 | End: 2019-11-13

## 2019-11-13 RX ORDER — OCTREOTIDE ACETATE 200 UG/ML
50 INJECTION, SOLUTION INTRAVENOUS; SUBCUTANEOUS
Qty: 500 | Refills: 0 | Status: DISCONTINUED | OUTPATIENT
Start: 2019-11-13 | End: 2019-11-14

## 2019-11-13 RX ADMIN — OCTREOTIDE ACETATE 50 MICROGRAM(S): 200 INJECTION, SOLUTION INTRAVENOUS; SUBCUTANEOUS at 12:20

## 2019-11-13 RX ADMIN — OCTREOTIDE ACETATE 10 MICROGRAM(S)/HR: 200 INJECTION, SOLUTION INTRAVENOUS; SUBCUTANEOUS at 21:02

## 2019-11-13 RX ADMIN — OCTREOTIDE ACETATE 10 MICROGRAM(S)/HR: 200 INJECTION, SOLUTION INTRAVENOUS; SUBCUTANEOUS at 12:20

## 2019-11-13 RX ADMIN — ONDANSETRON 4 MILLIGRAM(S): 8 TABLET, FILM COATED ORAL at 12:37

## 2019-11-13 RX ADMIN — OCTREOTIDE ACETATE 10 MICROGRAM(S)/HR: 200 INJECTION, SOLUTION INTRAVENOUS; SUBCUTANEOUS at 21:04

## 2019-11-13 RX ADMIN — Medication 0.5 MILLIGRAM(S): at 12:53

## 2019-11-13 RX ADMIN — PANTOPRAZOLE SODIUM 80 MILLIGRAM(S): 20 TABLET, DELAYED RELEASE ORAL at 11:39

## 2019-11-13 RX ADMIN — PANTOPRAZOLE SODIUM 10 MG/HR: 20 TABLET, DELAYED RELEASE ORAL at 12:47

## 2019-11-13 RX ADMIN — Medication 0.5 MILLIGRAM(S): at 23:38

## 2019-11-13 NOTE — ED PROVIDER NOTE - PROGRESS NOTE DETAILS
edvin jean fellow: pt feeling nauseous, no vomiting hb 9.9 . tx zofran/lorazepam for anxiety. will admit, called dr hammonds's  service, busy signal. edvin jean fellow: case discussed w [facility], accepts. pending EGd by LEXI manzo in the ed

## 2019-11-13 NOTE — ED PROVIDER NOTE - CLINICAL SUMMARY MEDICAL DECISION MAKING FREE TEXT BOX
hx of portal htn, esophageal varices, pw hematemesis, x 1 episodes 1 hour pta, asymptomatic, not tachycardic/hypotensive, pale appearing. presume esophageal variceal bleed, t&s/cbc/cmp/octreotide/protonix consult GI dr wall. admit.

## 2019-11-13 NOTE — ED PROVIDER NOTE - SKIN, MLM
He was given instructions Skin normal color for race, warm, dry and intact. No evidence of rash. (+) pale skin.

## 2019-11-13 NOTE — CONSULT NOTE ADULT - ASSESSMENT
25 yo F with congenital vascular anomaly, congenital portal vein thrombosis / portal htn and hypersplenism c/b recurrent variceal band ligation as a child now presenting with hematemesis  Rec:  NPO  F/U CBC  Pantoprazole infusion  Octreotide infusion  I have left a message for patient's hepatologist at [facility] - a surgical shunt is planned for the upcoming months and she is undergoing pre-operative evaluation.  Anticipate EGD pending review of CBC and discussion with hepatologist at [facility]    [phone]

## 2019-11-13 NOTE — H&P ADULT - ASSESSMENT
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 The patient has been stable in the past as per mother but had this episode that was witnessed by boyfriend. Patient is going for endoscopy today and is npo.    Assessment:  Hematemesis with GI bleed due to ??variccela bleed vs dionne carroll  Anemia due to GI bleed  Non-cirrhotic protal hypertension with hypersplenism  PCOS  Depression  Anxiety  GERD    Plan:  patient admitted to medicine  Repeat cbc stat  GI eval with endoscopy today  hold all AC on SCDdue to bleeding  Will hold lexapro and klonopin for now as patietn is pendign endoscopy  if agitated or anxious can use IV ativan 0.5mg q8h as needed  cont patient on octreotide and protonix drip.  cont NPO and diet recs as per GI once scoped.  cbc, bmp in am  discussed with mother

## 2019-11-13 NOTE — ED PROVIDER NOTE - ATTENDING CONTRIBUTION TO CARE
Patient with history of portal Hypertension and esophageal varices, presents with chief complaint of nausea followed by emesis of dark blood. Patient has history of endoscopy a few months ago with micro esophageal varices. Patient also with history of Dionne Laura tears. No fatigue, weakness, chest pain, or shortness of breath. No f/c.   normal skin color for ethnicity  non-tachycardic, non-tachypneic   no rash/vesicles/petechiae   soft, ntnd, no rebound/guarding   pleasant  cooperative  no cvat  concern for upper gi bleed, gi consulted at this time, cbc, cmp, iv, pt/inr, octreotide ivp, fluids prn and reassess  Will follow up on labs, analgesia, imaging, reassess and disposition to the inpatient team as clinically indicated.

## 2019-11-13 NOTE — CONSULT NOTE ADULT - SUBJECTIVE AND OBJECTIVE BOX
23 yo F with congenital vascular abnormality, Portal vein thromosis and non cirrhotic portal htn is seen for evaluation of hematemesis.  Patient has had recurrent hematemesis since she was age 1 and has had variceal hemorrhages requiring variceal band ligation as a child.  She has recently had more episodes of self limited hematemesis and has had numerous EGDs which show small esophageal varices.  She reports being in [facility] until this morning when she developed nausea and coffee ground emesis mixed with fresh red blood.  She denies any dizziness/weakness/abdominal pain. No melena.  She is awake alert and conversant. No confusion.  No fevers or chills.    Last EGD at [facility] 7/2019 - Small varices, no bleeding source identified.  EGD in [place] 8/2019 - family reports small varices and no bleeding source identified.  Pt had an EGD at [facility] few weeks ago (end of October) - Small varices.   She is on Propranolol 15 BID        PAST MEDICAL & SURGICAL HISTORY:  Iron deficiency anemia  ADHD (attention deficit hyperactivity disorder)  Polycystic ovarian syndrome: diagnosed 7/2011  Duplicate cervix  Vaginal septum: 1994  Branchio-elizabeth-renal syndrome  Esophageal varices: s/p banding 1996  Portal vein thrombosis: 1996  Thrombocytopenia  Depression  Anxiety  ASD (atrial septal defect)  Hypersplenism  Portal hypertension  Cataract: both eyes - cataract removal with lens implantation  Hernia: bilateral hernia repair  Megaureter: left (1994)  Varices, esophageal: s/p banding  ASD (atrial septal defect): s/p repair      MEDICATIONS  (STANDING):  octreotide  Infusion 50 MICROgram(s)/Hr (10 mL/Hr) IV Continuous <Continuous>  ondansetron Injectable 4 milliGRAM(s) IV Push once  pantoprazole Infusion 8 mG/Hr (10 mL/Hr) IV Continuous <Continuous>    MEDICATIONS  (PRN):      Allergies    No Known Allergies    Intolerances        Review of Systems:    General:  No wt loss, fevers, chills, night sweats,fatigue,   CV:  No pain, palpitatioins, hypo/hypertension  Resp:  No dyspnea, cough, tachypnea, wheezing  GI:  see hpi  :  No pain, bleeding, incontinence, nocturia  Muscle:  No pain, weakness  Neuro:  No weakness, tingling, memory problems  Psych:  No fatigue, insomnia, mood problems, depression  Endocrine:  No polyuria, polydypsia, cold/heat intolerance  Heme:  No petechiae, ecchymosis, easy bruisability  Skin:  No rash, tattoos, scars, edema      Vital Signs Last 24 Hrs  T(C): 36.7 (13 Nov 2019 12:08), Max: 36.7 (13 Nov 2019 12:08)  T(F): 98 (13 Nov 2019 12:08), Max: 98 (13 Nov 2019 12:08)  HR: 76 (13 Nov 2019 12:08) (74 - 76)  BP: 108/72 (13 Nov 2019 12:08) (106/71 - 108/72)  BP(mean): --  RR: 18 (13 Nov 2019 12:08) (18 - 18)  SpO2: 99% (13 Nov 2019 12:08) (99% - 100%)    PHYSICAL EXAM:    Constitutional: NAD, well-developed  Neck: No LAD, supple  Respiratory: CTA and P  Cardiovascular: S1 and S2, RRR, no M  Gastrointestinal: BS+, soft, NT/ND, neg HSM,  Extremities: No peripheral edema, neg clubing, cyanosis  Vascular: 2+ peripheral pulses  Neurological: A/O x 3, no focal deficits  Psychiatric: Normal mood, normal affect  Skin: No rashes      LABS:                        9.9    4.02  )-----------( x        ( 13 Nov 2019 11:58 )             31.8           PT/INR - ( 13 Nov 2019 11:58 )   PT: 13.4 sec;   INR: 1.17 ratio         PTT - ( 13 Nov 2019 11:58 )  PTT:30.0 sec            RADIOLOGY & ADDITIONAL TESTS:

## 2019-11-13 NOTE — CHART NOTE - NSCHARTNOTEFT_GEN_A_CORE
H/H reviewed. Hb 9.9. Baseline H/H unknown. Pt heme is at [facility] [address] [phone]   Scheduled for EGD this afternoon   Pls admit to hospitalist   PPi gtt  Octreotide gtt  NPO

## 2019-11-13 NOTE — H&P ADULT - HISTORY OF PRESENT ILLNESS
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          PAST MEDICAL & SURGICAL HISTORY:  Iron deficiency anemia  ADHD (attention deficit hyperactivity disorder)  Polycystic ovarian syndrome: diagnosed 7/2011  Duplicate cervix  Vaginal septum: 1994  Branchio-elizabeth-renal syndrome  Esophageal varices: s/p banding 1996  Portal vein thrombosis: 1996  Thrombocytopenia  Depression  Anxiety  ASD (atrial septal defect)  Hypersplenism  Portal hypertension  Cataract: both eyes - cataract removal with lens implantation  Hernia: bilateral hernia repair  Megaureter: left (1994)  Varices, esophageal: s/p banding  ASD (atrial septal defect): s/p repair      FAMILY HISTORY:  No pertinent family history in first degree relatives      SOCIAL HISTORY:    Allergies    No Known Allergies    Intolerances          REVIEW OF SYSEMS:  General: no weakness, no fever/chills, no weight loss/gain  Skin/Breast: no rash, no jaundice  Ophthalmologic: no vision changes, no dry eyes   Respiratory and Thorax: no cough, no wheezing, no hemoptysis, no dyspnea  Cardiovascular: no chest pain, no shortness of breath, no orthopnea  Gastrointestinal: no n/v/d, no abdominal pain, no dysphagia   Genitourinary: no dysuria, no frequency, no nocturia, no hematuria  Musculoskeletal: no trauma, no sprain/strain, no myalgias, no arthralgias, no fracture  Neurological: no HA, no dizziness, no weakness, no numbness  Psychiatric: no depression, no SI/HI  Hematology/Lymphatics: no easy bruising  Endocrine: no heat or cold intolerance. no weight gain or loss  Allergic/Immunologic: no allergy or recent reaction       Vital Signs Last 24 Hrs  T(C): 36.7 (13 Nov 2019 12:08), Max: 36.7 (13 Nov 2019 12:08)  T(F): 98 (13 Nov 2019 12:08), Max: 98 (13 Nov 2019 12:08)  HR: 77 (13 Nov 2019 12:55) (74 - 77)  BP: 111/76 (13 Nov 2019 12:55) (106/71 - 111/76)  BP(mean): --  RR: 18 (13 Nov 2019 12:55) (18 - 18)  SpO2: 99% (13 Nov 2019 12:55) (99% - 100%)  I&O's Summary      PHYSICAL EXAM:  GENERAL: NAD, Comfortable  HEAD:  Atraumatic, Normocephalic  EYES: EOMI, PERRLA, conjunctiva and sclera clear  NECK: Supple, No JVD  CHEST/LUNG: Clear to auscultation bilaterally; No wheeze  HEART: Regular rate and rhythm; No murmurs, rubs, or gallops  ABDOMEN: Soft, Nontender, Nondistended; Bowel sounds present  Neuro: AAOx3, no focal deficit, 5/5 b/l extremities  EXTREMITIES:  2+ Peripheral Pulses, No clubbing, cyanosis, or edema  SKIN: No rashes or lesions    LABS:                        9.9    4.02  )-----------( 95       ( 13 Nov 2019 11:58 )             31.8     11-13    139  |  101  |  20  ----------------------------<  104<H>  4.3   |  26  |  0.67    Ca    9.0      13 Nov 2019 11:58    TPro  6.9  /  Alb  4.4  /  TBili  0.8  /  DBili  x   /  AST  15  /  ALT  14  /  AlkPhos  64  11-13    PT/INR - ( 13 Nov 2019 11:58 )   PT: 13.4 sec;   INR: 1.17 ratio         PTT - ( 13 Nov 2019 11:58 )  PTT:30.0 sec  CAPILLARY BLOOD GLUCOSE                RADIOLOGY & ADDITIONAL TESTS:    Imaging Personally Reviewed:  [x] YES  [ ] NO    Consultant(s) Notes Reviewed:  [x] YES  [ ] NO      MEDICATIONS  (STANDING):  octreotide  Infusion 50 MICROgram(s)/Hr (10 mL/Hr) IV Continuous <Continuous>  pantoprazole Infusion 8 mG/Hr (10 mL/Hr) IV Continuous <Continuous>    MEDICATIONS  (PRN):      Care Discussed with Consultants/Other Providers [x] YES  [ ] NO    HEALTH ISSUES - PROBLEM Dx: Patient is a 24y old  Female who presents with a chief complaint of Hematemesis (13 Nov 2019 12:27)      HPI:  This is a 24y old female with PMH of Congenital Portal vein Thrombosis, non-cirrhotic portal hypertension, hypersplenism w/ thrombocytopenia, esophageal varices (5/3/96), PCOS, Depression, Anxiety, Asthma, Dionne-carroll syndrome, 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 The patient has been stable in the past as per mother but had this episode that was witnessed by boyfriend. Patient is going for endoscopy today and is npo.      PAST MEDICAL & SURGICAL HISTORY:  Iron deficiency anemia  ADHD (attention deficit hyperactivity disorder)  Polycystic ovarian syndrome: diagnosed 7/2011  Duplicate cervix  Vaginal septum: 1994  Branchio-elizabeth-renal syndrome  Esophageal varices: s/p banding 1996  Portal vein thrombosis: 1996  Thrombocytopenia  Depression  Anxiety  ASD (atrial septal defect)  Hypersplenism  Portal hypertension  Cataract: both eyes - cataract removal with lens implantation  Hernia: bilateral hernia repair  Megaureter: left (1994)  Varices, esophageal: s/p banding  ASD (atrial septal defect): s/p repair    FAMILY HISTORY:  No pertinent family history in first degree relatives      SOCIAL HISTORY:    Allergies    No Known Allergies    Intolerances          REVIEW OF SYSEMS:  General: no weakness, no fever/chills, no weight loss/gain  Skin/Breast: no rash, no jaundice  Ophthalmologic: no vision changes, no dry eyes   Respiratory and Thorax: no cough, no wheezing, no hemoptysis, no dyspnea  Cardiovascular: no chest pain, no shortness of breath, no orthopnea  Gastrointestinal: no n/v/d, no abdominal pain, no dysphagia   Genitourinary: no dysuria, no frequency, no nocturia, no hematuria  Musculoskeletal: no trauma, no sprain/strain, no myalgias, no arthralgias, no fracture  Neurological: no HA, no dizziness, no weakness, no numbness  Psychiatric: no depression, no SI/HI  Hematology/Lymphatics: no easy bruising  Endocrine: no heat or cold intolerance. no weight gain or loss  Allergic/Immunologic: no allergy or recent reaction       Vital Signs Last 24 Hrs  T(C): 36.7 (13 Nov 2019 12:08), Max: 36.7 (13 Nov 2019 12:08)  T(F): 98 (13 Nov 2019 12:08), Max: 98 (13 Nov 2019 12:08)  HR: 77 (13 Nov 2019 12:55) (74 - 77)  BP: 111/76 (13 Nov 2019 12:55) (106/71 - 111/76)  BP(mean): --  RR: 18 (13 Nov 2019 12:55) (18 - 18)  SpO2: 99% (13 Nov 2019 12:55) (99% - 100%)  I&O's Summary      PHYSICAL EXAM:  GENERAL: NAD, Comfortable  HEAD:  Atraumatic, Normocephalic  EYES: EOMI, PERRLA, conjunctiva and sclera clear  NECK: Supple, No JVD  CHEST/LUNG: Clear to auscultation bilaterally; No wheeze  HEART: Regular rate and rhythm; No murmurs, rubs, or gallops  ABDOMEN: Soft, Nontender, Nondistended; Bowel sounds present  Neuro: AAOx3, no focal deficit, 5/5 b/l extremities  EXTREMITIES:  2+ Peripheral Pulses, No clubbing, cyanosis, or edema  SKIN: No rashes or lesions    LABS:                        9.9    4.02  )-----------( 95       ( 13 Nov 2019 11:58 )             31.8     11-13    139  |  101  |  20  ----------------------------<  104<H>  4.3   |  26  |  0.67    Ca    9.0      13 Nov 2019 11:58    TPro  6.9  /  Alb  4.4  /  TBili  0.8  /  DBili  x   /  AST  15  /  ALT  14  /  AlkPhos  64  11-13    PT/INR - ( 13 Nov 2019 11:58 )   PT: 13.4 sec;   INR: 1.17 ratio         PTT - ( 13 Nov 2019 11:58 )  PTT:30.0 sec  CAPILLARY BLOOD GLUCOSE                RADIOLOGY & ADDITIONAL TESTS:    Imaging Personally Reviewed:  [x] YES  [ ] NO    Consultant(s) Notes Reviewed:  [x] YES  [ ] NO      MEDICATIONS  (STANDING):  octreotide  Infusion 50 MICROgram(s)/Hr (10 mL/Hr) IV Continuous <Continuous>  pantoprazole Infusion 8 mG/Hr (10 mL/Hr) IV Continuous <Continuous>    MEDICATIONS  (PRN):      Care Discussed with Consultants/Other Providers [x] YES  [ ] NO    HEALTH ISSUES - PROBLEM Dx:

## 2019-11-13 NOTE — ED PROVIDER NOTE - OBJECTIVE STATEMENT
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 Reports feeling nauseous w/ dry heaving. Vomited x 1 w/ dark-brown color. Denies any abdominal pain, melena, dark stools, fevers, chills, sick contacts, chest pain, sob, lightheadedness, syncope. Of note, being worked up at [facility] for possible surgery. Denies anticoagulation.    PMD Diaz Pastor  GI: Jones Paez  Psychologist Jono Carroll  Psych Alec Young  Neuro Jones Sterling  Pulyvonne Morrison.

## 2019-11-13 NOTE — PROGRESS NOTE ADULT - SUBJECTIVE AND OBJECTIVE BOX
Pre-Endoscopy Evaluation      Referring Physician:  dr. 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), ASD, migraines, p/w recurrent hematemesis    Sedation by Anesthesia [X]    PAST MEDICAL & SURGICAL HISTORY:  Iron deficiency anemia  ADHD (attention deficit hyperactivity disorder)  Polycystic ovarian syndrome: diagnosed 7/2011  Duplicate cervix  Vaginal septum: 1994  Branchio-elizabeth-renal syndrome  GIB  Variceal hemorrhage  Esophageal varices: s/p banding 1996  Portal vein thrombosis: 1996  Thrombocytopenia  Depression  Anxiety  Hypersplenism  Portal hypertension  Cataract: both eyes - cataract removal with lens implantation  Hernia: bilateral hernia repair  Megaureter: left (1994)  Varices, esophageal: s/p banding  ASD (atrial septal defect): s/p repair      PMH of Gastroparesis [ ]  Gastric Surgery [ ]  Gastric Outlet Obstruction [ ]    Allergies    No Known Allergies    Intolerances    Latex allergy: [ ] yes [X] no    Medications:MEDICATIONS  (STANDING):  octreotide  Infusion 50 MICROgram(s)/Hr (10 mL/Hr) IV Continuous <Continuous>  pantoprazole Infusion 8 mG/Hr (10 mL/Hr) IV Continuous <Continuous>    MEDICATIONS  (PRN):      Smoking: [ ] yes  [X] no    AICD/PPM: [ ] yes   [X] no    Pertinent lab data:                        9.9    4.02  )-----------( 95       ( 13 Nov 2019 11:58 )             31.8     11-13    139  |  101  |  20  ----------------------------<  104<H>  4.3   |  26  |  0.67    Ca    9.0      13 Nov 2019 11:58    TPro  6.9  /  Alb  4.4  /  TBili  0.8  /  DBili  x   /  AST  15  /  ALT  14  /  AlkPhos  64  11-13    PT/INR - ( 13 Nov 2019 11:58 )   PT: 13.4 sec;   INR: 1.17 ratio      PTT - ( 13 Nov 2019 11:58 )  PTT:30.0 sec      HCG Quantitative, Serum: 0.5 mIU/mL (11-13 @ 11:58)        Physical Examination:  Daily Height in cm: 149.86 (13 Nov 2019 10:33)    Daily   Vital Signs Last 24 Hrs  T(C): 36.7 (13 Nov 2019 12:08), Max: 36.7 (13 Nov 2019 12:08)  T(F): 98 (13 Nov 2019 12:08), Max: 98 (13 Nov 2019 12:08)  HR: 77 (13 Nov 2019 12:55) (74 - 77)  BP: 111/76 (13 Nov 2019 12:55) (106/71 - 111/76)  BP(mean): --  RR: 18 (13 Nov 2019 12:55) (18 - 18)  SpO2: 99% (13 Nov 2019 12:55) (99% - 100%)    Drug Dosing Weight  Height (cm): 149.86 (13 Nov 2019 10:33)  Weight (kg): 60.3 (13 Nov 2019 10:33)  BMI (kg/m2): 26.9 (13 Nov 2019 10:33)  BSA (m2): 1.55 (13 Nov 2019 10:33)    Constitutional: NAD     Neck:  No JVD    Respiratory: CTAB/L    Cardiovascular: S1 and S2    Gastrointestinal: BS+, soft, NT/ND    Extremities: No peripheral edema    Neurological: awake alert, oriented    : No Johns    Skin: No rashes    Comments:    The patient is a suitable candidate for the planned procedure unless box checked [ ]  No, explain:

## 2019-11-13 NOTE — ED ADULT NURSE NOTE - OBJECTIVE STATEMENT
pt here with c/o vomiting blood once.  no c/o abd pain or constipation.  pt appears alert and awake pt here with c/o vomiting blood once.  no c/o abd pain or constipation.  pt appears alert and awake  pt has a history of esophageal varices with bleeding  placed on CRM

## 2019-11-14 ENCOUNTER — TRANSCRIPTION ENCOUNTER (OUTPATIENT)
Age: 24
End: 2019-11-14

## 2019-11-14 VITALS
RESPIRATION RATE: 18 BRPM | OXYGEN SATURATION: 98 % | SYSTOLIC BLOOD PRESSURE: 97 MMHG | DIASTOLIC BLOOD PRESSURE: 63 MMHG | TEMPERATURE: 98 F | HEART RATE: 92 BPM

## 2019-11-14 DIAGNOSIS — D50.9 IRON DEFICIENCY ANEMIA, UNSPECIFIED: ICD-10-CM

## 2019-11-14 LAB
ALBUMIN SERPL ELPH-MCNC: 4.2 G/DL — SIGNIFICANT CHANGE UP (ref 3.3–5)
ALP SERPL-CCNC: 58 U/L — SIGNIFICANT CHANGE UP (ref 40–120)
ALT FLD-CCNC: 11 U/L — SIGNIFICANT CHANGE UP (ref 10–45)
ANION GAP SERPL CALC-SCNC: 12 MMOL/L — SIGNIFICANT CHANGE UP (ref 5–17)
AST SERPL-CCNC: 13 U/L — SIGNIFICANT CHANGE UP (ref 10–40)
BILIRUB SERPL-MCNC: 0.9 MG/DL — SIGNIFICANT CHANGE UP (ref 0.2–1.2)
BUN SERPL-MCNC: 14 MG/DL — SIGNIFICANT CHANGE UP (ref 7–23)
CALCIUM SERPL-MCNC: 8.7 MG/DL — SIGNIFICANT CHANGE UP (ref 8.4–10.5)
CHLORIDE SERPL-SCNC: 102 MMOL/L — SIGNIFICANT CHANGE UP (ref 96–108)
CO2 SERPL-SCNC: 26 MMOL/L — SIGNIFICANT CHANGE UP (ref 22–31)
CREAT SERPL-MCNC: 0.75 MG/DL — SIGNIFICANT CHANGE UP (ref 0.5–1.3)
GLUCOSE SERPL-MCNC: 96 MG/DL — SIGNIFICANT CHANGE UP (ref 70–99)
HCT VFR BLD CALC: 29.4 % — LOW (ref 34.5–45)
HGB BLD-MCNC: 9.2 G/DL — LOW (ref 11.5–15.5)
MCHC RBC-ENTMCNC: 27.5 PG — SIGNIFICANT CHANGE UP (ref 27–34)
MCHC RBC-ENTMCNC: 31.3 GM/DL — LOW (ref 32–36)
MCV RBC AUTO: 87.8 FL — SIGNIFICANT CHANGE UP (ref 80–100)
PLATELET # BLD AUTO: 103 K/UL — LOW (ref 150–400)
POTASSIUM SERPL-MCNC: 4.4 MMOL/L — SIGNIFICANT CHANGE UP (ref 3.5–5.3)
POTASSIUM SERPL-SCNC: 4.4 MMOL/L — SIGNIFICANT CHANGE UP (ref 3.5–5.3)
PROT SERPL-MCNC: 6.4 G/DL — SIGNIFICANT CHANGE UP (ref 6–8.3)
RBC # BLD: 3.35 M/UL — LOW (ref 3.8–5.2)
RBC # FLD: 14.2 % — SIGNIFICANT CHANGE UP (ref 10.3–14.5)
SODIUM SERPL-SCNC: 140 MMOL/L — SIGNIFICANT CHANGE UP (ref 135–145)
WBC # BLD: 4.27 K/UL — SIGNIFICANT CHANGE UP (ref 3.8–10.5)
WBC # FLD AUTO: 4.27 K/UL — SIGNIFICANT CHANGE UP (ref 3.8–10.5)

## 2019-11-14 PROCEDURE — 85610 PROTHROMBIN TIME: CPT

## 2019-11-14 PROCEDURE — 86850 RBC ANTIBODY SCREEN: CPT

## 2019-11-14 PROCEDURE — 99285 EMERGENCY DEPT VISIT HI MDM: CPT | Mod: 25

## 2019-11-14 PROCEDURE — 84702 CHORIONIC GONADOTROPIN TEST: CPT

## 2019-11-14 PROCEDURE — 93005 ELECTROCARDIOGRAM TRACING: CPT

## 2019-11-14 PROCEDURE — 86922 COMPATIBILITY TEST ANTIGLOB: CPT

## 2019-11-14 PROCEDURE — 82330 ASSAY OF CALCIUM: CPT

## 2019-11-14 PROCEDURE — 71045 X-RAY EXAM CHEST 1 VIEW: CPT

## 2019-11-14 PROCEDURE — 84295 ASSAY OF SERUM SODIUM: CPT

## 2019-11-14 PROCEDURE — 85027 COMPLETE CBC AUTOMATED: CPT

## 2019-11-14 PROCEDURE — 85730 THROMBOPLASTIN TIME PARTIAL: CPT

## 2019-11-14 PROCEDURE — 82435 ASSAY OF BLOOD CHLORIDE: CPT

## 2019-11-14 PROCEDURE — 82803 BLOOD GASES ANY COMBINATION: CPT

## 2019-11-14 PROCEDURE — 80053 COMPREHEN METABOLIC PANEL: CPT

## 2019-11-14 PROCEDURE — 96374 THER/PROPH/DIAG INJ IV PUSH: CPT

## 2019-11-14 PROCEDURE — 83605 ASSAY OF LACTIC ACID: CPT

## 2019-11-14 PROCEDURE — 86901 BLOOD TYPING SEROLOGIC RH(D): CPT

## 2019-11-14 PROCEDURE — 84132 ASSAY OF SERUM POTASSIUM: CPT

## 2019-11-14 PROCEDURE — 85014 HEMATOCRIT: CPT

## 2019-11-14 PROCEDURE — 82947 ASSAY GLUCOSE BLOOD QUANT: CPT

## 2019-11-14 PROCEDURE — 86900 BLOOD TYPING SEROLOGIC ABO: CPT

## 2019-11-14 PROCEDURE — 96375 TX/PRO/DX INJ NEW DRUG ADDON: CPT

## 2019-11-14 RX ORDER — INFLUENZA VIRUS VACCINE 15; 15; 15; 15 UG/.5ML; UG/.5ML; UG/.5ML; UG/.5ML
0.5 SUSPENSION INTRAMUSCULAR ONCE
Refills: 0 | Status: DISCONTINUED | OUTPATIENT
Start: 2019-11-14 | End: 2019-11-14

## 2019-11-14 RX ORDER — CLONAZEPAM 1 MG
1 TABLET ORAL ONCE
Refills: 0 | Status: DISCONTINUED | OUTPATIENT
Start: 2019-11-14 | End: 2019-11-14

## 2019-11-14 RX ORDER — PANTOPRAZOLE SODIUM 20 MG/1
1 TABLET, DELAYED RELEASE ORAL
Qty: 60 | Refills: 0
Start: 2019-11-14 | End: 2019-12-13

## 2019-11-14 RX ORDER — ACETAMINOPHEN 500 MG
650 TABLET ORAL ONCE
Refills: 0 | Status: COMPLETED | OUTPATIENT
Start: 2019-11-14 | End: 2019-11-14

## 2019-11-14 RX ADMIN — Medication 1 MILLIGRAM(S): at 06:11

## 2019-11-14 RX ADMIN — OCTREOTIDE ACETATE 10 MICROGRAM(S)/HR: 200 INJECTION, SOLUTION INTRAVENOUS; SUBCUTANEOUS at 06:11

## 2019-11-14 RX ADMIN — PANTOPRAZOLE SODIUM 10 MG/HR: 20 TABLET, DELAYED RELEASE ORAL at 06:11

## 2019-11-14 RX ADMIN — Medication 650 MILLIGRAM(S): at 01:00

## 2019-11-14 RX ADMIN — Medication 650 MILLIGRAM(S): at 00:31

## 2019-11-14 NOTE — PROGRESS NOTE ADULT - PROBLEM SELECTOR PLAN 1
s/p hematemesis  M-W tear on EGD   no furrther sx / hemodynamically stable  if hg stable -- d/c home on ppi  to f/u [facility] 11/15

## 2019-11-14 NOTE — DISCHARGE NOTE PROVIDER - CARE PROVIDER_API CALL
Diaz Zelaya)  Pediatrics  [address] [place] [id number]  Phone: [phone]  Fax: [phone]  Follow Up Time: 1 week    Suresh Hirsch  Hepatologist at [facility]  Phone: (   )    -  Fax: (   )    -  Follow Up Time: 1 week    Quinton John)  Gastroenterology; Internal Medicine  [address], [address]  [place] [address]  Phone: [phone]  Fax: [phone]  Follow Up Time: 1 week

## 2019-11-14 NOTE — DISCHARGE NOTE PROVIDER - HOSPITAL COURSE
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 The patient has been stable in the past as per mother but had this episode that was witnessed by boyfriend. Patient  s/p  endoscopy on 11/13. Pt now stable for discharge home. 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 The patient has been stable in the past as per mother but had this episode that was witnessed by boyfriend. Patient  s/p  endoscopy on 11/13 showing dionne laura tear. h/h stable. cleared by GI. Pt now stable for discharge home.         pe on day of dc:    GENERAL: NAD, AAOx3    HEAD:  Atraumatic, Normocephalic    EYES: EOMI, PERRLA, conjunctiva and sclera clear    NECK: Supple, No JVD, No LAD    CHEST/LUNG: CTABL, No wheeze    HEART: Regular rate and rhythm; No murmurs, rubs, or gallops    ABDOMEN: Soft, Nontender, Nondistended; Bowel sounds present    EXTREMITIES:  2+ Peripheral Pulses, No clubbing, cyanosis, or edema    SKIN: No rashes or lesions

## 2019-11-14 NOTE — DISCHARGE NOTE PROVIDER - NSDCMRMEDTOKEN_GEN_ALL_CORE_FT
clindamycin-benzoyl peroxide 1.2%-2.5% topical gel: Apply topically to affected area once a day  clonazePAM 1 mg oral tablet: 1 tab(s) orally 4 times a day  ferrous sulfate 325 mg (65 mg elemental iron) oral tablet: 1 tab(s) orally 1 to 2 times a day  Lexapro 20 mg oral tablet: 2 tab(s) orally once a day    NOTE: Prescribed as 1 tab twice day  pantoprazole 40 mg oral delayed release tablet: 1 tab(s) orally 2 times a day   Symbicort 160 mcg-4.5 mcg/inh inhalation aerosol: 2 puff(s) inhaled 2 times a day  Tylenol PM: 1 to 2  orally as needed

## 2019-11-14 NOTE — DISCHARGE NOTE PROVIDER - NSDCCPCAREPLAN_GEN_ALL_CORE_FT
PRINCIPAL DISCHARGE DIAGNOSIS  Diagnosis: Bleeding esophageal varices, unspecified esophageal varices type  Assessment and Plan of Treatment: Take Protonix as prescribed.  Follow up with our Gastroenterologist  and Hepatologist in 1 week.      SECONDARY DISCHARGE DIAGNOSES  Diagnosis: Iron deficiency anemia  Assessment and Plan of Treatment: Take Ferrous Sulfate as prescribed.  Follow up with your PMD within 1 week.

## 2019-11-14 NOTE — DISCHARGE NOTE NURSING/CASE MANAGEMENT/SOCIAL WORK - PATIENT PORTAL LINK FT
You can access the FollowMyHealth Patient Portal offered by [facility] by registering at the following website: [email or web address]. By joining [employer]’s FollowMyHealth portal, you will also be able to view your health information using other applications (apps) compatible with our system.

## 2019-11-14 NOTE — DISCHARGE NOTE PROVIDER - PROVIDER TOKENS
PROVIDER:[TOKEN:[[id number]],FOLLOWUP:[1 week]],FREE:[LAST:[Pourmand],FIRST:[Suresh],PHONE:[(   )    -],FAX:[(   )    -],ADDRESS:[Hepatologist at [facility]],FOLLOWUP:[1 week]],PROVIDER:[TOKEN:[[id number]],FOLLOWUP:[1 week]]

## 2019-11-14 NOTE — PROGRESS NOTE ADULT - SUBJECTIVE AND OBJECTIVE BOX
INTERVAL HPI/OVERNIGHT EVENTS:  pt geovani po   no further vomiting   onre "dark" bm     MEDICATIONS  (STANDING):  influenza   Vaccine 0.5 milliLiter(s) IntraMuscular once  octreotide  Infusion 50 MICROgram(s)/Hr (10 mL/Hr) IV Continuous <Continuous>  pantoprazole Infusion 8 mG/Hr (10 mL/Hr) IV Continuous <Continuous>    MEDICATIONS  (PRN):      Allergies    No Known Allergies    Intolerances        Review of Systems: see HPI      Vital Signs Last 24 Hrs  T(C): 36.5 (14 Nov 2019 05:08), Max: 36.9 (14 Nov 2019 00:08)  T(F): 97.7 (14 Nov 2019 05:08), Max: 98.4 (14 Nov 2019 00:08)  HR: 79 (14 Nov 2019 05:08) (74 - 88)  BP: 108/63 (14 Nov 2019 05:08) (104/70 - 111/76)  BP(mean): --  RR: 18 (14 Nov 2019 05:08) (18 - 18)  SpO2: 99% (14 Nov 2019 05:08) (97% - 100%)    PHYSICAL EXAM:    Constitutional: NAD, well-developed  Gastrointestinal: BS+, soft, NT/ND, neg HSM,  Psychiatric: Normal mood, normal affect      LABS:                        9.9    4.02  )-----------( 95       ( 13 Nov 2019 11:58 )             31.8     11-14    140  |  102  |  14  ----------------------------<  96  4.4   |  26  |  0.75    Ca    8.7      14 Nov 2019 07:20    TPro  6.4  /  Alb  4.2  /  TBili  0.9  /  DBili  x   /  AST  13  /  ALT  11  /  AlkPhos  58  11-14    PT/INR - ( 13 Nov 2019 11:58 )   PT: 13.4 sec;   INR: 1.17 ratio         PTT - ( 13 Nov 2019 11:58 )  PTT:30.0 sec    LIVER FUNCTIONS - ( 14 Nov 2019 07:20 )  Alb: 4.2 g/dL / Pro: 6.4 g/dL / ALK PHOS: 58 U/L / ALT: 11 U/L / AST: 13 U/L / GGT: x             RADIOLOGY & ADDITIONAL TESTS:

## 2019-11-27 ENCOUNTER — EMERGENCY (EMERGENCY)
Facility: HOSPITAL | Age: 24
LOS: 1 days | End: 2019-11-27
Attending: EMERGENCY MEDICINE
Payer: COMMERCIAL

## 2019-11-27 VITALS
SYSTOLIC BLOOD PRESSURE: 110 MMHG | DIASTOLIC BLOOD PRESSURE: 69 MMHG | OXYGEN SATURATION: 100 % | TEMPERATURE: 98 F | WEIGHT: 134.04 LBS | HEIGHT: 59 IN | RESPIRATION RATE: 18 BRPM | HEART RATE: 89 BPM

## 2019-11-27 LAB
ALBUMIN SERPL ELPH-MCNC: 3.7 G/DL — SIGNIFICANT CHANGE UP (ref 3.3–5)
ALP SERPL-CCNC: 59 U/L — SIGNIFICANT CHANGE UP (ref 40–120)
ALT FLD-CCNC: 17 U/L — SIGNIFICANT CHANGE UP (ref 10–45)
ANION GAP SERPL CALC-SCNC: 10 MMOL/L — SIGNIFICANT CHANGE UP (ref 5–17)
APTT BLD: 29.7 SEC — SIGNIFICANT CHANGE UP (ref 27.5–36.3)
AST SERPL-CCNC: 16 U/L — SIGNIFICANT CHANGE UP (ref 10–40)
BASE EXCESS BLDV CALC-SCNC: 4.6 MMOL/L — HIGH (ref -2–2)
BASOPHILS # BLD AUTO: 0 K/UL — SIGNIFICANT CHANGE UP (ref 0–0.2)
BASOPHILS NFR BLD AUTO: 0 % — SIGNIFICANT CHANGE UP (ref 0–2)
BILIRUB SERPL-MCNC: 0.6 MG/DL — SIGNIFICANT CHANGE UP (ref 0.2–1.2)
BLD GP AB SCN SERPL QL: NEGATIVE — SIGNIFICANT CHANGE UP
BUN SERPL-MCNC: 9 MG/DL — SIGNIFICANT CHANGE UP (ref 7–23)
CA-I SERPL-SCNC: 1.2 MMOL/L — SIGNIFICANT CHANGE UP (ref 1.12–1.3)
CALCIUM SERPL-MCNC: 8.6 MG/DL — SIGNIFICANT CHANGE UP (ref 8.4–10.5)
CHLORIDE BLDV-SCNC: 104 MMOL/L — SIGNIFICANT CHANGE UP (ref 96–108)
CHLORIDE SERPL-SCNC: 104 MMOL/L — SIGNIFICANT CHANGE UP (ref 96–108)
CO2 BLDV-SCNC: 32 MMOL/L — HIGH (ref 22–30)
CO2 SERPL-SCNC: 27 MMOL/L — SIGNIFICANT CHANGE UP (ref 22–31)
CREAT SERPL-MCNC: 0.71 MG/DL — SIGNIFICANT CHANGE UP (ref 0.5–1.3)
EOSINOPHIL # BLD AUTO: 0.07 K/UL — SIGNIFICANT CHANGE UP (ref 0–0.5)
EOSINOPHIL NFR BLD AUTO: 1.9 % — SIGNIFICANT CHANGE UP (ref 0–6)
GAS PNL BLDV: 139 MMOL/L — SIGNIFICANT CHANGE UP (ref 135–145)
GAS PNL BLDV: SIGNIFICANT CHANGE UP
GLUCOSE BLDV-MCNC: 102 MG/DL — HIGH (ref 70–99)
GLUCOSE SERPL-MCNC: 110 MG/DL — HIGH (ref 70–99)
HCG SERPL-ACNC: <2 MIU/ML — SIGNIFICANT CHANGE UP
HCO3 BLDV-SCNC: 30 MMOL/L — HIGH (ref 21–29)
HCT VFR BLD CALC: 17.6 % — CRITICAL LOW (ref 34.5–45)
HCT VFR BLDA CALC: 17 % — CRITICAL LOW (ref 39–50)
HGB BLD CALC-MCNC: 5.4 G/DL — CRITICAL LOW (ref 11.5–15.5)
HGB BLD-MCNC: 5.2 G/DL — CRITICAL LOW (ref 11.5–15.5)
IMM GRANULOCYTES NFR BLD AUTO: 0.3 % — SIGNIFICANT CHANGE UP (ref 0–1.5)
INR BLD: 1.2 RATIO — HIGH (ref 0.88–1.16)
LACTATE BLDV-MCNC: 1.3 MMOL/L — SIGNIFICANT CHANGE UP (ref 0.7–2)
LYMPHOCYTES # BLD AUTO: 0.85 K/UL — LOW (ref 1–3.3)
LYMPHOCYTES # BLD AUTO: 23.6 % — SIGNIFICANT CHANGE UP (ref 13–44)
MCHC RBC-ENTMCNC: 27.4 PG — SIGNIFICANT CHANGE UP (ref 27–34)
MCHC RBC-ENTMCNC: 29.5 GM/DL — LOW (ref 32–36)
MCV RBC AUTO: 92.6 FL — SIGNIFICANT CHANGE UP (ref 80–100)
MONOCYTES # BLD AUTO: 0.38 K/UL — SIGNIFICANT CHANGE UP (ref 0–0.9)
MONOCYTES NFR BLD AUTO: 10.6 % — SIGNIFICANT CHANGE UP (ref 2–14)
NEUTROPHILS # BLD AUTO: 2.29 K/UL — SIGNIFICANT CHANGE UP (ref 1.8–7.4)
NEUTROPHILS NFR BLD AUTO: 63.6 % — SIGNIFICANT CHANGE UP (ref 43–77)
NRBC # BLD: 0 /100 WBCS — SIGNIFICANT CHANGE UP (ref 0–0)
PCO2 BLDV: 56 MMHG — HIGH (ref 35–50)
PH BLDV: 7.35 — SIGNIFICANT CHANGE UP (ref 7.35–7.45)
PLATELET # BLD AUTO: 104 K/UL — LOW (ref 150–400)
PO2 BLDV: 33 MMHG — SIGNIFICANT CHANGE UP (ref 25–45)
POTASSIUM BLDV-SCNC: 4 MMOL/L — SIGNIFICANT CHANGE UP (ref 3.5–5.3)
POTASSIUM SERPL-MCNC: 4.2 MMOL/L — SIGNIFICANT CHANGE UP (ref 3.5–5.3)
POTASSIUM SERPL-SCNC: 4.2 MMOL/L — SIGNIFICANT CHANGE UP (ref 3.5–5.3)
PROT SERPL-MCNC: 5.7 G/DL — LOW (ref 6–8.3)
PROTHROM AB SERPL-ACNC: 13.7 SEC — HIGH (ref 10–12.9)
RBC # BLD: 1.9 M/UL — LOW (ref 3.8–5.2)
RBC # FLD: 17.7 % — HIGH (ref 10.3–14.5)
RH IG SCN BLD-IMP: POSITIVE — SIGNIFICANT CHANGE UP
SAO2 % BLDV: 50 % — LOW (ref 67–88)
SODIUM SERPL-SCNC: 141 MMOL/L — SIGNIFICANT CHANGE UP (ref 135–145)
WBC # BLD: 3.6 K/UL — LOW (ref 3.8–10.5)
WBC # FLD AUTO: 3.6 K/UL — LOW (ref 3.8–10.5)

## 2019-11-27 PROCEDURE — 99218: CPT

## 2019-11-27 RX ORDER — SODIUM CHLORIDE 9 MG/ML
3 INJECTION INTRAMUSCULAR; INTRAVENOUS; SUBCUTANEOUS EVERY 8 HOURS
Refills: 0 | Status: DISCONTINUED | OUTPATIENT
Start: 2019-11-27 | End: 2019-12-02

## 2019-11-27 RX ORDER — PROPRANOLOL HCL 160 MG
10 CAPSULE, EXTENDED RELEASE 24HR ORAL
Refills: 0 | Status: DISCONTINUED | OUTPATIENT
Start: 2019-11-27 | End: 2019-12-02

## 2019-11-27 RX ORDER — ESCITALOPRAM OXALATE 10 MG/1
40 TABLET, FILM COATED ORAL DAILY
Refills: 0 | Status: DISCONTINUED | OUTPATIENT
Start: 2019-11-27 | End: 2019-12-02

## 2019-11-27 RX ORDER — ESCITALOPRAM OXALATE 10 MG/1
20 TABLET, FILM COATED ORAL ONCE
Refills: 0 | Status: DISCONTINUED | OUTPATIENT
Start: 2019-11-27 | End: 2019-11-27

## 2019-11-27 RX ORDER — PANTOPRAZOLE SODIUM 20 MG/1
40 TABLET, DELAYED RELEASE ORAL
Refills: 0 | Status: DISCONTINUED | OUTPATIENT
Start: 2019-11-27 | End: 2019-12-02

## 2019-11-27 RX ORDER — FERROUS SULFATE 325(65) MG
325 TABLET ORAL ONCE
Refills: 0 | Status: COMPLETED | OUTPATIENT
Start: 2019-11-27 | End: 2019-11-27

## 2019-11-27 RX ORDER — CLONAZEPAM 1 MG
1 TABLET ORAL EVERY 6 HOURS
Refills: 0 | Status: DISCONTINUED | OUTPATIENT
Start: 2019-11-27 | End: 2019-11-27

## 2019-11-27 RX ORDER — PROPRANOLOL HCL 160 MG
10 CAPSULE, EXTENDED RELEASE 24HR ORAL ONCE
Refills: 0 | Status: DISCONTINUED | OUTPATIENT
Start: 2019-11-27 | End: 2019-11-27

## 2019-11-27 RX ORDER — PROPRANOLOL HCL 160 MG
5 CAPSULE, EXTENDED RELEASE 24HR ORAL
Refills: 0 | Status: DISCONTINUED | OUTPATIENT
Start: 2019-11-27 | End: 2019-12-02

## 2019-11-27 RX ADMIN — Medication 325 MILLIGRAM(S): at 22:53

## 2019-11-27 RX ADMIN — Medication 5 MILLIGRAM(S): at 22:56

## 2019-11-27 RX ADMIN — ESCITALOPRAM OXALATE 40 MILLIGRAM(S): 10 TABLET, FILM COATED ORAL at 22:54

## 2019-11-27 RX ADMIN — Medication 10 MILLIGRAM(S): at 22:56

## 2019-11-27 NOTE — ED ADULT NURSE NOTE - OBJECTIVE STATEMENT
23 y/o female a+ox3, pmhx multiple GI bleed most recent 8/2019,multiple transfusions, congential portal vein thrombosis, portal HTN, hyperspenism with thrombocytopenia, izzy-carroll, coming from home for low hemoglobin level. Pt had labs drawn as a "follow up" to recent GI bleed admission this August 2019; hemoglobin was low, came to ED for eval. Pt denies abdominal pain, n/v/d, melena, hematuria, vaginal bleeding, chest pain or discomfort, headache, lightheadedness, dizziness, difficulty breathing. Pt appears pale, mother at bedside states that is "normal" for her. IV established, labs obtained. Pt left in position of comfort, guard rails up, bed low and locked. Will reassess

## 2019-11-27 NOTE — ED CDU PROVIDER INITIAL DAY NOTE - DETAILS
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 Hardy

## 2019-11-27 NOTE — ED CDU PROVIDER INITIAL DAY NOTE - PROGRESS NOTE DETAILS
CDU PROGRESS NOTE NGA TAYLOR: Received pt from NGA Granda at 1900 sign-out. Pt resting in stretcher in NAD. Case/plan reviewed. VSS. Pt ate dinner. Ambulatory around unit with steady gait. Pt without complaints. Will continue to monitor overnight. CDU NOTE NGA TAYLOR: Pt resting comfortably, feeling well without complaint. denies fatigue, lightheadedness, CP, SOB, abd pain, bleeding, abd distention. NAD, VSS. Abd: flat/ND/NT/soft. pt to get 2nd unit of blood. tolerated 1st unit without ADRs. will continue monitoring.

## 2019-11-27 NOTE — CONSULT NOTE ADULT - ASSESSMENT
23 yo F with congenital vascular anomaly, congenital portal vein thrombosis / portal htn and hypersplenism c/b recurrent variceal band ligation as a child now presenting with anemia w/o overt bleeding  Rec:  Pt refusing a rectal exam. Denies any type of bleeding - hematemesis or melena / hematochezia.  F/U post transfusion CBC  Pantoprazole d  US abdomen given mild abdominal distention r/o ascites  Diet as tolerated  Anticipate TIPS procedure at [facility] 12/19    [phone]

## 2019-11-27 NOTE — ED PROVIDER NOTE - PROGRESS NOTE DETAILS
Discussed with Dr. james John, advised patient may be placed in CDU for transfusion and discharge. Discussed with Dr. james John, advised patient may be placed in CDU for transfusion and discharge. Dr. John recommending 2 units. Consent form signed.

## 2019-11-27 NOTE — ED PROVIDER NOTE - CLINICAL SUMMARY MEDICAL DECISION MAKING FREE TEXT BOX
25 y/o female presenting today c/o low hgb. Asymptomatic. Vitals wnl. Plan includes labs, re-assess, possible transfusion. 25 y/o female presenting today c/o low hgb. Asymptomatic. Vitals wnl. Plan includes labs, re-assess, possible transfusion.  Hayley: 24 year old female here with anemia. history of izzy-carroll syndrome, reports to have found low hgb levels. scheduled for tipps in the next few weeks. will get labs, transfuse, reassesses.

## 2019-11-27 NOTE — ED CDU PROVIDER INITIAL DAY NOTE - OBJECTIVE STATEMENT
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 notes hgb was 5.9 and was drawn with PCP Diaz Zelaya. Notes hx of GI bleed. LBm this morning without blood or melena. admits to hx of transfusion. TIPPS procedure scheduled for 12/19/19. pt denies dizziness, SOB, CP, abdominal pain, fever, chills, N/V< hemoptysis, hematemesis, or any other complaints.    ED course: H/H 5.2/17.6, CMP negative. Patient transferred to the CDU for 3 units PRBCs and follow up CBC

## 2019-11-27 NOTE — CONSULT NOTE ADULT - SUBJECTIVE AND OBJECTIVE BOX
25 yo F with congenital vascular abnormality, Portal vein thromosis and non cirrhotic portal htn is seen for evaluation of anemia. Recently admitted with hematemesis. EGD showed MW tear and small varices.    Patient has had recurrent hematemesis since she was age 1 and has had variceal hemorrhages requiring variceal band ligation as a child.  She has recently had more episodes of self limited hematemesis and has had numerous EGDs which show small esophageal varices.  Pt states she had BW w/ PMD which showed hb 5. No overt bleeding. Reports having a normal brown BM today  She denies any dizziness/weakness/abdominal pain. No melena.  She is awake alert and conversant. No confusion.  No fevers or chills.      EGD at [facility] 7/2019 - Small varices, no bleeding source identified.  EGD in [place] 8/2019 - family reports small varices and no bleeding source identified.  Pt had an EGD at [facility] few weeks ago (end of October) - Small varices.   She is on Propranolol 15 BID    PAST MEDICAL & SURGICAL HISTORY:  Iron deficiency anemia  ADHD (attention deficit hyperactivity disorder)  Polycystic ovarian syndrome: diagnosed 7/2011  Duplicate cervix  Vaginal septum: 1994  Branchio-elizabeth-renal syndrome  Esophageal varices: s/p banding 1996  Portal vein thrombosis: 1996  Thrombocytopenia  Depression  Anxiety  ASD (atrial septal defect)  Hypersplenism  Portal hypertension  Cataract: both eyes - cataract removal with lens implantation  Hernia: bilateral hernia repair  Megaureter: left (1994)  Varices, esophageal: s/p banding  ASD (atrial septal defect): s/p repair      MEDICATIONS  (STANDING):  sodium chloride 0.9% lock flush 3 milliLiter(s) IV Push every 8 hours    MEDICATIONS  (PRN):      Allergies    No Known Allergies    Intolerances        Review of Systems:    General:  No wt loss, fevers, chills, night sweats,fatigue,   CV:  No pain, palpitatioins, hypo/hypertension  Resp:  No dyspnea, cough, tachypnea, wheezing  GI: see hpi  :  No pain, bleeding, incontinence, nocturia  Muscle:  No pain, weakness  Neuro:  No weakness, tingling, memory problems  Psych:  No fatigue, insomnia, mood problems, depression  Endocrine:  No polyuria, polydypsia, cold/heat intolerance  Heme:  No petechiae, ecchymosis, easy bruisability  Skin:  No rash, tattoos, scars, edema      Vital Signs Last 24 Hrs  T(C): 36.9 (27 Nov 2019 17:20), Max: 37.3 (27 Nov 2019 16:40)  T(F): 98.4 (27 Nov 2019 17:20), Max: 99.1 (27 Nov 2019 16:40)  HR: 83 (27 Nov 2019 17:20) (83 - 99)  BP: 113/73 (27 Nov 2019 17:20) (99/58 - 113/73)  BP(mean): 73 (27 Nov 2019 16:40) (69 - 73)  RR: 17 (27 Nov 2019 17:20) (16 - 18)  SpO2: 98% (27 Nov 2019 17:20) (98% - 100%)    PHYSICAL EXAM:    Constitutional: NAD, well-developed  Neck: No LAD, supple  Respiratory: CTA and P  Cardiovascular: S1 and S2, RRR, no M  Gastrointestinal: BS+, soft, distended  Rectal : Refused  Extremities: No peripheral edema, neg clubing, cyanosis  Vascular: 2+ peripheral pulses  Neurological: A/O x 3, no focal deficits  Psychiatric: Normal mood, normal affect  Skin: No rashes      LABS:                        5.2    3.60  )-----------( 104      ( 27 Nov 2019 14:29 )             17.6     11-27    141  |  104  |  9   ----------------------------<  110<H>  4.2   |  27  |  0.71    Ca    8.6      27 Nov 2019 14:29    TPro  5.7<L>  /  Alb  3.7  /  TBili  0.6  /  DBili  x   /  AST  16  /  ALT  17  /  AlkPhos  59  11-27    PT/INR - ( 27 Nov 2019 14:29 )   PT: 13.7 sec;   INR: 1.20 ratio         PTT - ( 27 Nov 2019 14:29 )  PTT:29.7 sec    LIVER FUNCTIONS - ( 27 Nov 2019 14:29 )  Alb: 3.7 g/dL / Pro: 5.7 g/dL / ALK PHOS: 59 U/L / ALT: 17 U/L / AST: 16 U/L / GGT: x                 RADIOLOGY & ADDITIONAL TESTS:

## 2019-11-27 NOTE — ED CDU PROVIDER INITIAL DAY NOTE - MEDICAL DECISION MAKING DETAILS
Hayley: 24 year old female with multiple medical problems here with anemia. history of varices and ttp.  history of gi bleed. no recent bleeding per rectu. mild vaginal spotting.  will c/w monitoring, 3u prbc and f/u cbc.

## 2019-11-27 NOTE — ED PROVIDER NOTE - OBJECTIVE STATEMENT
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 notes hgb was 5.9 and was drawn with PCP Diaz Zelaya. Notes hx of GI bleed. LBm this morning without blood or melena. admits to hx of transfusion. TIPPS procedure scheduled for 12/19/19. pt denies dizziness, SOB, CP, abdominal pain, fever, chills, N/V< hemoptysis, hematemesis, or any other complaints.

## 2019-11-28 VITALS
TEMPERATURE: 98 F | DIASTOLIC BLOOD PRESSURE: 61 MMHG | OXYGEN SATURATION: 98 % | HEART RATE: 79 BPM | RESPIRATION RATE: 18 BRPM | SYSTOLIC BLOOD PRESSURE: 98 MMHG

## 2019-11-28 LAB
HCT VFR BLD CALC: 27.8 % — LOW (ref 34.5–45)
HGB BLD-MCNC: 9.1 G/DL — LOW (ref 11.5–15.5)
MCHC RBC-ENTMCNC: 29.1 PG — SIGNIFICANT CHANGE UP (ref 27–34)
MCHC RBC-ENTMCNC: 32.7 GM/DL — SIGNIFICANT CHANGE UP (ref 32–36)
MCV RBC AUTO: 88.8 FL — SIGNIFICANT CHANGE UP (ref 80–100)
NRBC # BLD: 0 /100 WBCS — SIGNIFICANT CHANGE UP (ref 0–0)
PLATELET # BLD AUTO: 83 K/UL — LOW (ref 150–400)
RBC # BLD: 3.13 M/UL — LOW (ref 3.8–5.2)
RBC # FLD: 16.8 % — HIGH (ref 10.3–14.5)
WBC # BLD: 4.66 K/UL — SIGNIFICANT CHANGE UP (ref 3.8–10.5)
WBC # FLD AUTO: 4.66 K/UL — SIGNIFICANT CHANGE UP (ref 3.8–10.5)

## 2019-11-28 PROCEDURE — 86902 BLOOD TYPE ANTIGEN DONOR EA: CPT

## 2019-11-28 PROCEDURE — G0378: CPT

## 2019-11-28 PROCEDURE — 86900 BLOOD TYPING SEROLOGIC ABO: CPT

## 2019-11-28 PROCEDURE — 99291 CRITICAL CARE FIRST HOUR: CPT | Mod: 25

## 2019-11-28 PROCEDURE — 82803 BLOOD GASES ANY COMBINATION: CPT

## 2019-11-28 PROCEDURE — 85014 HEMATOCRIT: CPT

## 2019-11-28 PROCEDURE — 99217: CPT

## 2019-11-28 PROCEDURE — 96374 THER/PROPH/DIAG INJ IV PUSH: CPT

## 2019-11-28 PROCEDURE — 82947 ASSAY GLUCOSE BLOOD QUANT: CPT

## 2019-11-28 PROCEDURE — 82435 ASSAY OF BLOOD CHLORIDE: CPT

## 2019-11-28 PROCEDURE — P9040: CPT

## 2019-11-28 PROCEDURE — 82330 ASSAY OF CALCIUM: CPT

## 2019-11-28 PROCEDURE — 86922 COMPATIBILITY TEST ANTIGLOB: CPT

## 2019-11-28 PROCEDURE — 76705 ECHO EXAM OF ABDOMEN: CPT | Mod: 26

## 2019-11-28 PROCEDURE — 85610 PROTHROMBIN TIME: CPT

## 2019-11-28 PROCEDURE — 84132 ASSAY OF SERUM POTASSIUM: CPT

## 2019-11-28 PROCEDURE — 84702 CHORIONIC GONADOTROPIN TEST: CPT

## 2019-11-28 PROCEDURE — 85730 THROMBOPLASTIN TIME PARTIAL: CPT

## 2019-11-28 PROCEDURE — 80053 COMPREHEN METABOLIC PANEL: CPT

## 2019-11-28 PROCEDURE — 36430 TRANSFUSION BLD/BLD COMPNT: CPT

## 2019-11-28 PROCEDURE — 86901 BLOOD TYPING SEROLOGIC RH(D): CPT

## 2019-11-28 PROCEDURE — 84295 ASSAY OF SERUM SODIUM: CPT

## 2019-11-28 PROCEDURE — 85027 COMPLETE CBC AUTOMATED: CPT

## 2019-11-28 PROCEDURE — 76705 ECHO EXAM OF ABDOMEN: CPT

## 2019-11-28 PROCEDURE — 86850 RBC ANTIBODY SCREEN: CPT

## 2019-11-28 PROCEDURE — 83605 ASSAY OF LACTIC ACID: CPT

## 2019-11-28 RX ORDER — FERROUS SULFATE 325(65) MG
1 TABLET ORAL
Qty: 0 | Refills: 0 | DISCHARGE

## 2019-11-28 RX ORDER — METOCLOPRAMIDE HCL 10 MG
10 TABLET ORAL ONCE
Refills: 0 | Status: COMPLETED | OUTPATIENT
Start: 2019-11-28 | End: 2019-11-28

## 2019-11-28 RX ORDER — ESCITALOPRAM OXALATE 10 MG/1
2 TABLET, FILM COATED ORAL
Qty: 0 | Refills: 0 | DISCHARGE

## 2019-11-28 RX ORDER — ACETAMINOPHEN 500 MG
650 TABLET ORAL ONCE
Refills: 0 | Status: COMPLETED | OUTPATIENT
Start: 2019-11-28 | End: 2019-11-28

## 2019-11-28 RX ORDER — BUDESONIDE AND FORMOTEROL FUMARATE DIHYDRATE 160; 4.5 UG/1; UG/1
2 AEROSOL RESPIRATORY (INHALATION)
Qty: 0 | Refills: 0 | DISCHARGE

## 2019-11-28 RX ORDER — PROPRANOLOL HCL 160 MG
15 CAPSULE, EXTENDED RELEASE 24HR ORAL
Qty: 0 | Refills: 0 | DISCHARGE

## 2019-11-28 RX ORDER — CLONAZEPAM 1 MG
1 TABLET ORAL
Qty: 0 | Refills: 0 | DISCHARGE

## 2019-11-28 RX ORDER — CLINDAMYCIN PHOSPHATE AND BENZOYL PEROXIDE 10; 50 MG/G; MG/G
1 GEL TOPICAL
Qty: 0 | Refills: 0 | DISCHARGE

## 2019-11-28 RX ADMIN — Medication 650 MILLIGRAM(S): at 00:41

## 2019-11-28 RX ADMIN — PANTOPRAZOLE SODIUM 40 MILLIGRAM(S): 20 TABLET, DELAYED RELEASE ORAL at 08:21

## 2019-11-28 RX ADMIN — SODIUM CHLORIDE 3 MILLILITER(S): 9 INJECTION INTRAMUSCULAR; INTRAVENOUS; SUBCUTANEOUS at 00:40

## 2019-11-28 RX ADMIN — SODIUM CHLORIDE 3 MILLILITER(S): 9 INJECTION INTRAMUSCULAR; INTRAVENOUS; SUBCUTANEOUS at 06:24

## 2019-11-28 RX ADMIN — Medication 10 MILLIGRAM(S): at 01:11

## 2019-11-28 NOTE — ED CDU PROVIDER DISPOSITION NOTE - PATIENT PORTAL LINK FT
You can access the FollowMyHealth Patient Portal offered by [facility] by registering at the following website: [email or web address]. By joining [email or web address]’s FollowMyHealth portal, you will also be able to view your health information using other applications (apps) compatible with our system.

## 2019-11-28 NOTE — ED CDU PROVIDER SUBSEQUENT DAY NOTE - PROGRESS NOTE DETAILS
CDU NOTE NGA TAYLOR: Pt resting comfortably, feeling well without complaints. tolerated 3U PRBCs, no ADRs. NAD, VSS. abd US and repeat CBC pending. will continue monitoring. Patient seen at bedside in NAD.  VSS.  Patient resting comfortably without complaints. Patient reports that she is feeling well.  Denies bleeding.  Will repeat cbc and awaiting abd US and final GI recs.  -Crow Echeverria PA-C Patient seen at bedside in NAD.  VSS.  Patient resting comfortably without complaints. Repeat hgb 9.1.  US negative for ascites.  Patient re-evaluated by GI who is recommending discharge home to follow up with Dr. John.  Patient agreeable with plan.  Strict return precautions provided.  -Crow Echeverria PA-C

## 2019-11-28 NOTE — ED CDU PROVIDER SUBSEQUENT DAY NOTE - HISTORY
No interval changes since initial CDU provider note. Pt feels well aside from mild 3/10 H/A, would like tylenol. NAD, VSS. pt tolerated 2U PRBCs well, no ADRs. pt to get another unit and to get abd US. will give tylenol and continue monitoring. - NGA Kumar

## 2019-11-28 NOTE — ED ADULT NURSE REASSESSMENT NOTE - NS ED NURSE REASSESS COMMENT FT1
11.00 am  Pt was evaluated by CDU MD Brad Elise  Pt feeling better PO challenges tolerated well  Pt is discharged Ml out NGA snider explained the follow up care & gave the discharge summary  pt verbalized understanding on follow up care pt stable to go home  Pt is A&OX 4  steady gait has stable vitals  at the time of discharge
@1900 Pt received from DIRK Castellanos . Pt oriented to CDU & plan of care was discussed. Pt A&O x 3. Pt in CDU for 3 units PRBC's and follow up CBC 4 hours after, and an US. Pt denies any SOB, weakness, fatigue, or lightheadedness as of now. Pt resting in bed with mother at bedside. Safety & comfort measures maintained. Call bell in reach. Will continue to monitor.    @2116 PRBC's started, pt tolerated will. no adverse reactions as of now. will continue to monitor.
07.00 Am Pt received from DIRK Mendoza pt is observed for Anemia Pt received 3 units of blood awaiting for repeat CBC  . Pt oriented to CDU & plan of care was discussed. Pt A&O x 3. Pt denies any SOB, nausea, chills, or chest pain fever chills N/V/D. . Pt resting in bed. Safety & comfort measures maintained. IV site looks Clean & dry   No signs of infiltration noted Call bell in reach. Will continue to monitor.

## 2019-11-28 NOTE — PROGRESS NOTE ADULT - ASSESSMENT
25 yo F with congenital vascular anomaly, congenital portal vein thrombosis / portal htn and hypersplenism c/b recurrent variceal band ligation as a child now presenting with anemia w/o overt bleeding  Rec:  CBC stable  Pantoprazole daily  follow-up US abdomen  Diet as tolerated  Anticipate TIPS procedure at [facility] 12/19  f/u with Dr. John  D/w ER PA and pt/family

## 2019-11-28 NOTE — ED CDU PROVIDER DISPOSITION NOTE - NSFOLLOWUPINSTRUCTIONS_ED_ALL_ED_FT
1. Continue your Iron supplements twice a day on an empty stomach (with juice/water), 2 hrs prior to or 4hrs after antacids. Eat foods rich in iron such as spinach, lentils, beans, beef, veal, chicken, salmon, and tuna (3oz servings). Continue your other home medications as prescribed.  2. Drink plenty of fluids to stay hydrated.  3. You will need to follow-up with your PMD and GI specialist within 2-3 days for your anemia. A copy of your results were given to you to bring to your appt.  4. Return to ER for lightheaded/dizziness, chest pain, palpitations, shortness of breath, active bleeding, or any other concerns.

## 2019-11-28 NOTE — PROGRESS NOTE ADULT - SUBJECTIVE AND OBJECTIVE BOX
INTERVAL HPI/OVERNIGHT EVENTS:   Received 3 units PRBCs.  Hb appropriately luz.  NO bleeding.  No abd pain.      MEDICATIONS  (STANDING):  escitalopram 40 milliGRAM(s) Oral daily  pantoprazole    Tablet 40 milliGRAM(s) Oral before breakfast  propranolol 10 milliGRAM(s) Oral two times a day  propranolol 5 milliGRAM(s) Oral two times a day  sodium chloride 0.9% lock flush 3 milliLiter(s) IV Push every 8 hours    MEDICATIONS  (PRN):  clonazePAM  Tablet 1 milliGRAM(s) Oral every 6 hours PRN anxiety      Allergies    No Known Allergies    Intolerances            PHYSICAL EXAM:   Vital Signs:  Vital Signs Last 24 Hrs  T(C): 37.2 (28 Nov 2019 08:22), Max: 37.3 (27 Nov 2019 16:40)  T(F): 99 (28 Nov 2019 08:22), Max: 99.1 (27 Nov 2019 16:40)  HR: 88 (28 Nov 2019 08:22) (71 - 99)  BP: 99/62 (28 Nov 2019 08:22) (99/58 - 116/75)  BP(mean): 73 (27 Nov 2019 16:40) (69 - 73)  RR: 18 (28 Nov 2019 08:22) (16 - 18)  SpO2: 98% (28 Nov 2019 08:22) (98% - 100%)  Daily Height in cm: 149.86 (27 Nov 2019 11:46)    Daily     GENERAL:  Appears stated age, well-groomed, well-nourished, no distress  HEENT:  NC/AT,  conjunctivae clear and pink, no thyromegaly, nodules, adenopathy, no JVD, sclera -anicteric  CHEST:  Full & symmetric excursion, no increased effort, breath sounds clear  HEART:  Regular rhythm, S1, S2, no murmur/rub/S3/S4, no abdominal bruit, no edema  ABDOMEN:  Soft, non-tender, non-distended, normoactive bowel sounds,  no masses ,no hepato-splenomegaly, no signs of chronic liver disease  EXTEREMITIES:  no cyanosis,clubbing or edema      LABS:                        9.1    4.66  )-----------( 83       ( 28 Nov 2019 08:53 )             27.8     11-27    141  |  104  |  9   ----------------------------<  110<H>  4.2   |  27  |  0.71    Ca    8.6      27 Nov 2019 14:29    TPro  5.7<L>  /  Alb  3.7  /  TBili  0.6  /  DBili  x   /  AST  16  /  ALT  17  /  AlkPhos  59  11-27    PT/INR - ( 27 Nov 2019 14:29 )   PT: 13.7 sec;   INR: 1.20 ratio         PTT - ( 27 Nov 2019 14:29 )  PTT:29.7 sec      RADIOLOGY & ADDITIONAL TESTS:

## 2019-11-28 NOTE — ED CDU PROVIDER DISPOSITION NOTE - CARE PROVIDER_API CALL
Jones John)  Internal Medicine  [address] [place] [address]  Phone: [phone]  Fax: [phone]  Follow Up Time:

## 2020-11-11 NOTE — PROGRESS NOTE ADULT - NSHPATTENDINGPLANDISCUSS_GEN_ALL_CORE
Client notified of negative covid test results via telephone message and reviewed below:  · Do NOT travel out of home area.  · Avoid outings from home-leave ONLY for essential needs  · Immediately report any new symptoms or suspected/known exposures to COVID-19 cases.  · Maintain physical distancing (at least 6 feet) at all times both at home and away from home  · Wash hands frequently and thoroughly with soap and water (lather at least 20 seconds) or disinfect with alcohol-based hand  before eating, before touching face/mouth/eyes, after touching shared objects or high touch surfaces.  · Regularly clean cell phones, door handles, light switches, faucet and toilet handles, bathrooms, and kitchen surfaces using household disinfectant or hot soapy water.  · No visitors except a support person for the patient for the following:  · Active POAs  · Patients less than 18 years old can have parent  · Patients with diagnosed and documented dementia who need help  · Patients who need assistance making it to the appropriate department safely. (Once they are to the department the support person will be asked to leave)  It will be an expectation for the patient/support person to wear a mask at all times during their stay.    
pt and her father in detail
parents father by bedside and mother over the phone in great detail . They want to take her home.

## 2020-12-07 NOTE — PROGRESS NOTE ADULT - PROBLEM SELECTOR PLAN 6
Thank you for the referral!    Benjamin Cordova
DVT: IMPROVE 0 and in the setting of possible bleed will hold AC  Diet: NPO for planned EGD

## 2020-12-18 NOTE — DISCHARGE NOTE ADULT - ADDITIONAL INSTRUCTIONS
abdominal pain Please follow up with your gastroenterologist within a week of leaving the hospital. It is also important that they check your CBC as an outpt to check your hemoglobin level.

## 2021-08-19 NOTE — ED CDU PROVIDER INITIAL DAY NOTE - CPE EDP RESP NORM
"You were evaluated today in the Cardiovascular Telemedicine Clinic at the [facility].     Cardiology Provider during your visit: Dr. Stephanie Campoverde    Diagnosis:  Kidney transplant candidate    Results: discussed with patient; stress test 2021 normal    Orders:   None    Medication Changes:   None    Recommendations:   None    Follow-up:   As needed  Please follow up with primary care provider for medication refills         Please feel free to call me with any questions or concerns.     Jessica Freed RN     Questions and [phone].   First press #1 for the [employer] and then press #4 for \"Medical Questions\" to reach us Cardiology Nurses.      On Call Cardiologist for after hours or on weekends: [phone]   option #4 and ask to speak to the on-call Cardiologist.          If you need a medication refill please contact your pharmacy.  Please allow 3 business days for your refill to be completed.   "
normal...

## 2021-11-29 NOTE — ED PROVIDER NOTE - PROGRESS NOTE DETAILS
16-Nov-2021 20:50 Vanessa Tong M.D. Resident  Pt's PMD called. Answering service number called 4 times, line busy. Vanessa Tong M.D. Resident  unattached hospitalist paged.

## 2022-04-12 NOTE — ANESTHESIA FOLLOW-UP NOTE - NSINTERVIEW_GEN_ALL_CORE
Impression: Chronic angle-closure glaucoma, right eye, severe stage: I54.1664. LPI OU Hx of ACG OD
CCT average OU
IOP/ONH  stable OU Plan: Under the care of DR. Apryl Mckeon, keep all appts. Per Dr. Apryl Mckeon: Continue brimonidine tid od, timolol bid od and latanoprost qhs od. No drops needed OS. Interviewed and evaluated

## 2022-07-07 NOTE — PATIENT PROFILE ADULT. - PRO SERVICES AT [facility]
Spoke with Rustam and advised that Dr. Tahir Gill was not in the office today and that I would ask him tomorrow. She said that was fine, she did say that Dr. Royer Nolen advised patient to stop the Xarelto 5 days prior to surgery which is scheduled for 7/15. none

## 2022-08-08 NOTE — ED ADULT NURSE NOTE - NSSISCREENINGQ4_ED_A_ED
ACUTE VISIT NOTE    CHIEF COMPLAINT:   Chief Complaint   Patient presents with   • Mouth/Lip Problem     Upper lip swelling/numbness x3 days         HISTORY OF PRESENT ILLNESS: Patient is an 74 year old female who presents with the following chief complaint   Chief Complaint   Patient presents with   • Mouth/Lip Problem     Upper lip swelling/numbness x3 days    Patient presents with concerns of upper lip and gum swelling and numbness. She has had symptoms for the past three days. She denies any problem with throat closing, tongue swelling and respiratory problem. She also denies any rash.     Review of Systems   Constitutional: Negative for chills, diaphoresis and fever.   HENT: Positive for facial swelling and mouth sores. Negative for congestion, dental problem and trouble swallowing.    Respiratory: Negative for cough, choking, shortness of breath, wheezing and stridor.    Cardiovascular: Negative for chest pain.       Past medical, family, and social history reviewed and updated.    Current Outpatient Medications   Medication Sig Dispense Refill   • Levbid 0.375 MG 12 hr tablet Take 0.375 mg by mouth 2 times daily.     • gabapentin (NEURONTIN) 300 MG capsule TAKE 1 CAPSULE BY MOUTH EVERY NIGHT AT BEDTIME FOR NERVE PAIN     • chlorhexidine gluconate (PERIDEX) 0.12 % solution Swish and spit 15 mLs  in the morning and 15 mLs before bedtime. 473 mL 0   • montelukast (SINGULAIR) 10 MG tablet TAKE 1 TABLET BY MOUTH DAILY 90 tablet 3   • sucralfate (CARAFATE) 1 g tablet TAKE 1 TABLET BY MOUTH BEFORE EACH MEAL AND AT BEDTIME 60 tablet 3   • dicyclomine (BENTYL) 10 MG capsule Take 1 capsule by mouth 4 times daily (before meals and nightly). 120 capsule 1   • gabapentin (NEURONTIN) 100 MG capsule TAKE 1 CAPSULE BY MOUTH TWICE DAILY FOR NERVE PAIN 60 capsule 3   • losartan (COZAAR) 100 MG tablet TAKE 1 TABLET BY MOUTH DAILY 90 tablet 0   • simvastatin (ZOCOR) 40 MG tablet TAKE 1 TABLET BY MOUTH AT BEDTIME 90 tablet 0   •  clobetasol (TEMOVATE) 0.05 % cream every 12 hours.     • HYDROcodone-acetaminophen (NORCO)  MG per tablet 1 or 2 every 4 hours as needed for severe pain. 60 tablet 0   • pantoprazole (PROTONIX) 40 MG tablet Take 1 tablet by mouth 2 times daily. 180 tablet 3   • zolpidem (AMBIEN) 10 MG tablet Take 1 tablet by mouth nightly as needed for Sleep. 90 tablet 3   • baclofen (LIORESAL) 10 MG tablet 1 p.o. nightly for muscle spasm 90 tablet 3   • amoxicillin (AMOXIL) 500 MG capsule TAKE 4 CAPSULES BY MOUTH 1 HR PRIOR TO DENTAL APPOINTMENT     • famotidine (PEPCID) 20 MG tablet 1 p.o. twice daily     • fluticasone (FLONASE) 50 MCG/ACT nasal spray Spray 2 sprays in each nostril at bedtime.     • Pseudoephedrine HCl (WAL-PHED D PO) Take 1 tablet by mouth nightly as needed (allergies).     • GELATIN PO Take 1,300 mg by mouth 4 times daily. 4 tablets daily      • Probiotic Product (PROBIOTIC-10 PO) Take 1 tablet by mouth daily. daily      • ZINC SULFATE PO Take 1 tablet by mouth at bedtime.      • naLOXone (NARCAN) 4 MG/0.1ML nasal spray Call 911. Hope the content of 1 device into 1 nostril. May repeat with 2nd device in alternate nostril if no response in 2-3 minutes. 2 each 3   • MULTIPLE VITAMINS-MINERALS PO Take 1 tablet by mouth daily.      • Cholecalciferol (VITAMIN D3) 5000 units capsule Take 5,000 Units by mouth daily.      • vitamin B-12 (CYANOCOBALAMIN) 1000 MCG tablet Take 1,000 mcg by mouth daily.        No current facility-administered medications for this visit.        ALLERGIES:   Allergen Reactions   • Influenza Vaccines GI UPSET   • Klor-Con GI UPSET         Physical Exam  Constitutional:       Appearance: Normal appearance.   HENT:      Mouth/Throat:      Mouth: No angioedema.      Dentition: Gingival swelling present.      Tongue: No lesions. Tongue does not deviate from midline.      Palate: No mass and lesions.   Cardiovascular:      Rate and Rhythm: Normal rate and regular rhythm.   Pulmonary:       Effort: Pulmonary effort is normal. No respiratory distress.      Breath sounds: Normal breath sounds. No stridor. No wheezing.   Neurological:      Mental Status: She is alert.       Visit Vitals  /73 (BP Location: LUE - Left upper extremity, Patient Position: Sitting, Cuff Size: Large Adult)   Pulse 93   Temp 96.9 °F (36.1 °C) (Temporal)   Resp 16   Ht 5' 5.5\" (1.664 m)   Wt 113.4 kg (250 lb)   SpO2 96%   BMI 40.97 kg/m²          ASSESSMENT:   Problem List Items Addressed This Visit        ENT    Lip swelling - Primary     Suspecting that lip swelling is more  Likely due to localized reaction than any signs of angioedema or allergic reaction. She took few antihistamines and swelling has improved. Continue with antihistamines.          Gingival erythema     Suspecting that patient has gingival disease. Will prescribe Chlorhexidine.                   PLAN:   Orders Placed This Encounter   • Levbid 0.375 MG 12 hr tablet   • gabapentin (NEURONTIN) 300 MG capsule   • chlorhexidine gluconate (PERIDEX) 0.12 % solution        Tanya Garcia MD   8/8/2022   4:35 AM    No

## 2023-01-24 NOTE — ED PROVIDER NOTE - NS ED MD EM SELECTION
32[address] Comprehensive High Dose Vitamin A Counseling: Side effects reviewed, pt to contact office should one occur.

## 2023-08-31 NOTE — ED ADULT NURSE NOTE - ED STAT RN HAND OFF
Onset: 2 days      Location / description: pt just started taking prozac for the first time 3 days ago. Pt states on Tuesday night she experienced dizziness and tiredness, \"cloudiness in her head \".   Second night patient didn't sleep at all last night.Pt is complaining of a headache states it is \"killing her \". Pt hasn't taken any tylenol at all today   Pt is also trying to cut back on drinking, but the last couple days she has increased in her drinking,   Pt has cut her drinking down to 3 drinks a day which  dr is aware poPatient also c/o constipation but she had a BM this morning   Pt also complaining of abdominal cramping , pain is coming and going.   Patient states she had 5, vodka drinks today,Yesterday she had  5 drinks, Tuesday had 3 drinks .  Precipitating Factors: pt recently started on prozac    Pain Scale (1-10), 10 highest: 6/10  Pt rates the abd pain a 4/10    What improves/worsens symptoms: \" if I took a tylenol that would make it a little better\".     Symptom specific medications: prozac     LMP : Patient's last menstrual period was 11/14/2022.     Are you pregnant or breast feeding: n/a    Recent visits (last 3-4 weeks) for same reason or recent surgery:      PLAN:    See Provider/Urgent care in next 4 hours    Patient/Caller agrees to follow recommendations.    Reason for Disposition  • [1] MILD-MODERATE pain AND [2] constant AND [3] present > 2 hours    Protocols used: ABDOMINAL PAIN - FEMALE-A-       Handoff

## 2024-05-10 NOTE — ED CDU PROVIDER INITIAL DAY NOTE - CROS ED GI ALL NEG
CC:  Daryl Marin is here today for Office Visit and Referral (Referred by Darell Salas MD for obstructive sleep apnea (pediatric))    Medications: medications verified and updated  Added preferred pharmacy  Denies  known Latex allergy or symptoms of Latex sensitivity.  All allergies and medications reviewed.  Patient would like communication of their results via:      Cell Phone:   Telephone Information:   Mobile [phone]   Mobile Not on file.     Okay to leave a message containing results? Yes      Rooming documentation of social history includes tobacco screening only.    negative...